# Patient Record
Sex: MALE | Race: WHITE | NOT HISPANIC OR LATINO | Employment: OTHER | URBAN - METROPOLITAN AREA
[De-identification: names, ages, dates, MRNs, and addresses within clinical notes are randomized per-mention and may not be internally consistent; named-entity substitution may affect disease eponyms.]

---

## 2017-08-30 ENCOUNTER — OFFICE VISIT (OUTPATIENT)
Dept: ORTHOPEDICS | Facility: CLINIC | Age: 67
End: 2017-08-30
Payer: MEDICARE

## 2017-08-30 VITALS
DIASTOLIC BLOOD PRESSURE: 77 MMHG | HEART RATE: 75 BPM | HEIGHT: 71 IN | BODY MASS INDEX: 33.46 KG/M2 | WEIGHT: 239 LBS | SYSTOLIC BLOOD PRESSURE: 157 MMHG

## 2017-08-30 DIAGNOSIS — Z96.653 S/P TOTAL KNEE REPLACEMENT, BILATERAL: Primary | ICD-10-CM

## 2017-08-30 LAB — CRP SERPL-MCNC: 0.33 MG/DL (ref 0–1.4)

## 2017-08-30 PROCEDURE — 73560 X-RAY EXAM OF KNEE 1 OR 2: CPT | Mod: RT,,, | Performed by: ORTHOPAEDIC SURGERY

## 2017-08-30 PROCEDURE — 99213 OFFICE O/P EST LOW 20 MIN: CPT | Mod: ,,, | Performed by: ORTHOPAEDIC SURGERY

## 2017-08-30 PROCEDURE — 1125F AMNT PAIN NOTED PAIN PRSNT: CPT | Mod: ,,, | Performed by: ORTHOPAEDIC SURGERY

## 2017-08-30 PROCEDURE — 3008F BODY MASS INDEX DOCD: CPT | Mod: ,,, | Performed by: ORTHOPAEDIC SURGERY

## 2017-08-30 PROCEDURE — 1159F MED LIST DOCD IN RCRD: CPT | Mod: ,,, | Performed by: ORTHOPAEDIC SURGERY

## 2017-08-30 RX ORDER — CLOTRIMAZOLE 1 G/ML
1 SOLUTION TOPICAL 2 TIMES DAILY
COMMUNITY

## 2017-08-30 RX ORDER — UREA 10 %
1 LOTION (ML) TOPICAL
COMMUNITY

## 2017-08-30 RX ORDER — BUSPIRONE HYDROCHLORIDE 10 MG/1
20 TABLET ORAL 3 TIMES DAILY
COMMUNITY
End: 2019-02-27

## 2017-08-30 RX ORDER — NAPROXEN 500 MG/1
500 TABLET ORAL 2 TIMES DAILY WITH MEALS
COMMUNITY

## 2017-08-30 RX ORDER — ATORVASTATIN CALCIUM 80 MG/1
40 TABLET, FILM COATED ORAL NIGHTLY
COMMUNITY

## 2017-08-30 RX ORDER — PANTOPRAZOLE SODIUM 40 MG/1
40 TABLET, DELAYED RELEASE ORAL DAILY
COMMUNITY

## 2017-08-30 RX ORDER — KETOCONAZOLE 20 MG/ML
1 SHAMPOO, SUSPENSION TOPICAL
COMMUNITY

## 2017-08-30 RX ORDER — METHOCARBAMOL 750 MG/1
500 TABLET, FILM COATED ORAL 3 TIMES DAILY
COMMUNITY

## 2017-08-30 RX ORDER — AMMONIUM LACTATE 12 G/100G
1 CREAM TOPICAL
Status: ON HOLD | COMMUNITY
End: 2018-02-06

## 2017-08-30 RX ORDER — SERTRALINE HYDROCHLORIDE 100 MG/1
100 TABLET, FILM COATED ORAL DAILY
Status: ON HOLD | COMMUNITY
End: 2018-02-06 | Stop reason: ALTCHOICE

## 2017-08-30 RX ORDER — OXYCODONE AND ACETAMINOPHEN 10; 325 MG/1; MG/1
1 TABLET ORAL
COMMUNITY
End: 2018-04-27 | Stop reason: SDUPTHER

## 2017-08-30 RX ORDER — GABAPENTIN 400 MG/1
400 CAPSULE ORAL 3 TIMES DAILY
COMMUNITY

## 2017-08-30 RX ORDER — OXYBUTYNIN CHLORIDE 5 MG/1
5 TABLET ORAL 3 TIMES DAILY
COMMUNITY

## 2017-08-30 NOTE — PROGRESS NOTES
Past Medical History:   Diagnosis Date    Arthritis     Cancer     Hypertension        Past Surgical History:   Procedure Laterality Date    BACK SURGERY      burnt nerves      ELBOW SURGERY  2010, 2008    left, right    GALLBLADDER SURGERY      KNEE SURGERY  2001, 2007    left and right knee    prostate cancer      SHOULDER SURGERY  2004    right shoulder       Current Outpatient Prescriptions   Medication Sig    ammonium lactate 12 % Crea Apply topically.    atorvastatin (LIPITOR) 80 MG tablet Take 80 mg by mouth once daily.    BED UNDERPADS MISC by Misc.(Non-Drug; Combo Route) route.    busPIRone (BUSPAR) 10 MG tablet Take 10 mg by mouth 3 (three) times daily.    clotrimazole (LOTRIMIN) 1 % Soln Apply topically 2 (two) times daily.    DIAPER,BRIEF,ADULT, DISPOSABLE (DEPEND UNDERWEAR FOR MEN L-XL MISC) by Misc.(Non-Drug; Combo Route) route.    gabapentin (NEURONTIN) 400 MG capsule Take 400 mg by mouth 3 (three) times daily.    INCONTINENCE PAD,LINER,DISP (POISE PADS EXTRA PLUS MISC) by Misc.(Non-Drug; Combo Route) route.    ketoconazole (NIZORAL) 2 % shampoo Apply topically twice a week.    methocarbamol (ROBAXIN) 750 MG Tab Take 500 mg by mouth 3 (three) times daily.    naproxen (NAPROSYN) 500 MG tablet Take 500 mg by mouth 2 (two) times daily with meals.    oxybutynin (DITROPAN) 5 MG Tab Take 5 mg by mouth 3 (three) times daily.    oxycodone-acetaminophen (PERCOCET)  mg per tablet Take 1 tablet by mouth as needed for Pain.    pantoprazole (PROTONIX) 40 MG tablet Take 40 mg by mouth once daily.    sertraline (ZOLOFT) 100 MG tablet Take 100 mg by mouth 2 (two) times daily.    urea (CARMOL) 10 % cream Apply topically as needed.    VITAMIN B COMPLEX ORAL Take by mouth Daily.     No current facility-administered medications for this visit.        Review of patient's allergies indicates:  No Known Allergies    History reviewed. No pertinent family history.    Social History     Social  "History    Marital status:      Spouse name: N/A    Number of children: N/A    Years of education: N/A     Occupational History    Not on file.     Social History Main Topics    Smoking status: Former Smoker    Smokeless tobacco: Never Used    Alcohol use No    Drug use: No    Sexual activity: Not on file     Other Topics Concern    Not on file     Social History Narrative    No narrative on file       Chief Complaint:   Chief Complaint   Patient presents with    Right Knee - Follow-up     His knee has been painful for a couple years now and was drained at his last visit about a year ago.  He is also experiencing ankle swelling.  He had right knee surgery in 2007.       Consulting Physician: No ref. provider found    History of Present Illness:    This is a 67 y.o. year old male who complains of patient returns today follow-up of his right knee had a total knee replacement done a few years several years ago but Dr. Ferrari he complains of some swelling at times he states he has some discomfort at night      ROS    Examination:    Vital Signs:    Vitals:    08/30/17 0843   BP: (!) 157/77   Pulse: 75   Weight: 108.4 kg (239 lb)   Height: 5' 10.5" (1.791 m)   PainSc:   8       This a well-developed, well nourished patient in no acute distress.    Alert and oriented and cooperative to examination.       Physical Exam:  Right Knee Exam    Gait:   Normal    Skin  Rash:   None  Scars:   healed    Inspection  Erythema:  None  Bruising:  None  Effusion:    Masses:  None  Lymphadenopathy: None    Range of Motion: 0 to  90    Medial Joint : None  Lateral Joint : None    Patellofemoral Tenderness: None  Patellofemoral Crepitus: None    Lachman:  Normal  Anterior Drawer: Normal  Posterior Drawer: Normal    Terrell's:  Negative  Apley's:  Negative    Varus Stress:  Stable  Valgus Stress:  Stable    Strength:  5/5    Pulses:  Palpable distal    Sensation:  Intact          Imaging: X-rays " ordered and reviewed today x-ray today of the right knee shows a right total knee replacement in good position no evidence of any loosening     Assessment: status post right total knee replacement with effusion    Plan:  We'll get a CRP and see if there is any evidence of possible infection      DISCLAIMER: This note may have been dictated using voice recognition software and may contain grammatical errors.     NOTE: Consult report sent to referring provider via [employer] EMR.

## 2017-09-06 ENCOUNTER — OFFICE VISIT (OUTPATIENT)
Dept: ORTHOPEDICS | Facility: CLINIC | Age: 67
End: 2017-09-06
Payer: MEDICARE

## 2017-09-06 VITALS
HEIGHT: 71 IN | DIASTOLIC BLOOD PRESSURE: 62 MMHG | HEART RATE: 61 BPM | SYSTOLIC BLOOD PRESSURE: 157 MMHG | WEIGHT: 239 LBS | BODY MASS INDEX: 33.46 KG/M2

## 2017-09-06 DIAGNOSIS — Z96.653 S/P TOTAL KNEE REPLACEMENT, BILATERAL: Primary | ICD-10-CM

## 2017-09-06 PROCEDURE — 1125F AMNT PAIN NOTED PAIN PRSNT: CPT | Mod: ,,, | Performed by: ORTHOPAEDIC SURGERY

## 2017-09-06 PROCEDURE — 1159F MED LIST DOCD IN RCRD: CPT | Mod: ,,, | Performed by: ORTHOPAEDIC SURGERY

## 2017-09-06 PROCEDURE — 99213 OFFICE O/P EST LOW 20 MIN: CPT | Mod: 25,,, | Performed by: ORTHOPAEDIC SURGERY

## 2017-09-06 PROCEDURE — 20610 DRAIN/INJ JOINT/BURSA W/O US: CPT | Mod: RT,,, | Performed by: ORTHOPAEDIC SURGERY

## 2017-09-06 PROCEDURE — 3008F BODY MASS INDEX DOCD: CPT | Mod: ,,, | Performed by: ORTHOPAEDIC SURGERY

## 2017-09-06 NOTE — PROCEDURES
Large Joint Aspiration/Injection  Date/Time: 9/6/2017 2:58 PM  Performed by: STEVE RILEY  Authorized by: STEVE RILEY     Consent Done?:  Yes (Verbal)  Indications:  Pain and joint swelling  Procedure site marked: Yes    Timeout: Prior to procedure the correct patient, procedure, and site was verified      Location:  Knee  Site:  R knee  Prep: Patient was prepped and draped in usual sterile fashion    Needle size:  22 G  Approach:  Lateral  Patient tolerance:  Patient tolerated the procedure well with no immediate complications

## 2017-09-06 NOTE — PROGRESS NOTES
Past Medical History:   Diagnosis Date    Arthritis     Cancer     Hypertension        Past Surgical History:   Procedure Laterality Date    BACK SURGERY      burnt nerves      ELBOW SURGERY  2010, 2008    left, right    GALLBLADDER SURGERY      KNEE SURGERY  2001, 2007    left and right knee    prostate cancer      SHOULDER SURGERY  2004    right shoulder       Current Outpatient Prescriptions   Medication Sig    ammonium lactate 12 % Crea Apply topically.    atorvastatin (LIPITOR) 80 MG tablet Take 80 mg by mouth once daily.    BED UNDERPADS MISC by Misc.(Non-Drug; Combo Route) route.    busPIRone (BUSPAR) 10 MG tablet Take 10 mg by mouth 3 (three) times daily.    clotrimazole (LOTRIMIN) 1 % Soln Apply topically 2 (two) times daily.    DIAPER,BRIEF,ADULT, DISPOSABLE (DEPEND UNDERWEAR FOR MEN L-XL MISC) by Misc.(Non-Drug; Combo Route) route.    gabapentin (NEURONTIN) 400 MG capsule Take 400 mg by mouth 3 (three) times daily.    INCONTINENCE PAD,LINER,DISP (POISE PADS EXTRA PLUS MISC) by Misc.(Non-Drug; Combo Route) route.    ketoconazole (NIZORAL) 2 % shampoo Apply topically twice a week.    methocarbamol (ROBAXIN) 750 MG Tab Take 500 mg by mouth 3 (three) times daily.    naproxen (NAPROSYN) 500 MG tablet Take 500 mg by mouth 2 (two) times daily with meals.    oxybutynin (DITROPAN) 5 MG Tab Take 5 mg by mouth 3 (three) times daily.    oxycodone-acetaminophen (PERCOCET)  mg per tablet Take 1 tablet by mouth as needed for Pain.    pantoprazole (PROTONIX) 40 MG tablet Take 40 mg by mouth once daily.    sertraline (ZOLOFT) 100 MG tablet Take 100 mg by mouth 2 (two) times daily.    urea (CARMOL) 10 % cream Apply topically as needed.    VITAMIN B COMPLEX ORAL Take by mouth Daily.     No current facility-administered medications for this visit.        Review of patient's allergies indicates:  No Known Allergies    History reviewed. No pertinent family history.    Social History     Social  "History    Marital status:      Spouse name: N/A    Number of children: N/A    Years of education: N/A     Occupational History    Not on file.     Social History Main Topics    Smoking status: Former Smoker    Smokeless tobacco: Never Used    Alcohol use No    Drug use: No    Sexual activity: Not on file     Other Topics Concern    Not on file     Social History Narrative    No narrative on file       Chief Complaint:   Chief Complaint   Patient presents with    Right Knee - Follow-up     His knee has been painful for a couple years now and has had previous knee surgery.  He is still having pain in his right knee but is here today for his lab results.  They are in the chart under labs.       Consulting Physician: No ref. provider found    History of Present Illness:    This is a 67 y.o. year old male who complains of his returns today for results of his blood work he still has some effusion in the right knee      ROS    Examination:    Vital Signs:    Vitals:    09/06/17 1421   BP: (!) 157/62   Pulse: 61   Weight: 108.4 kg (239 lb)   Height: 5' 10.5" (1.791 m)   PainSc:   8       This a well-developed, well nourished patient in no acute distress.    Alert and oriented and cooperative to examination.       Physical Exam: rightKnee Exam    Gait   Normal    Skin  Rash:   None  Scars:   None    Inspection  Erythema:  None  Bruising:  None  Effusion:  moderate  Masses:  None  Lymphadenopathy: None    Range of Motion: 0 to 110    Medial Joint : None  Lateral Joint : None    Patellofemoral Tenderness: None  Patellofemoral Crepitus: None    Lachman:  Normal  Anterior Drawer: Normal  Posterior Drawer: Normal    Terrell's:  Negative  Apley's:  Negative    Varus Stress:  Stable  Valgus Stress:  Stable    Strength:  5/5    Pulses:  Palpable distal    Sensation:  Intact          Imaging: X-rays ordered and reviewed today previous x-rays show a well-positionedright total knee replacement " with no loosening     Assessment: status post right total knee replacement with recurrent effusion and tightness    Plan:  We'll aspirate the knee and send it in for cell count cultures      DISCLAIMER: This note may have been dictated using voice recognition software and may contain grammatical errors.     NOTE: Consult report sent to referring provider via [employer] EMR.

## 2017-09-13 ENCOUNTER — OFFICE VISIT (OUTPATIENT)
Dept: ORTHOPEDICS | Facility: CLINIC | Age: 67
End: 2017-09-13
Payer: MEDICARE

## 2017-09-13 VITALS
WEIGHT: 239 LBS | HEIGHT: 71 IN | DIASTOLIC BLOOD PRESSURE: 78 MMHG | SYSTOLIC BLOOD PRESSURE: 146 MMHG | BODY MASS INDEX: 33.46 KG/M2 | HEART RATE: 88 BPM

## 2017-09-13 DIAGNOSIS — Z96.653 S/P TOTAL KNEE REPLACEMENT, BILATERAL: Primary | ICD-10-CM

## 2017-09-13 PROCEDURE — 99213 OFFICE O/P EST LOW 20 MIN: CPT | Mod: 25,,, | Performed by: ORTHOPAEDIC SURGERY

## 2017-09-13 PROCEDURE — 3008F BODY MASS INDEX DOCD: CPT | Mod: ,,, | Performed by: ORTHOPAEDIC SURGERY

## 2017-09-13 PROCEDURE — 73560 X-RAY EXAM OF KNEE 1 OR 2: CPT | Mod: RT,,, | Performed by: ORTHOPAEDIC SURGERY

## 2017-09-13 PROCEDURE — 1125F AMNT PAIN NOTED PAIN PRSNT: CPT | Mod: ,,, | Performed by: ORTHOPAEDIC SURGERY

## 2017-09-13 PROCEDURE — 1159F MED LIST DOCD IN RCRD: CPT | Mod: ,,, | Performed by: ORTHOPAEDIC SURGERY

## 2017-09-13 NOTE — PROGRESS NOTES
Past Medical History:   Diagnosis Date    Arthritis     Cancer     Hypertension        Past Surgical History:   Procedure Laterality Date    BACK SURGERY      burnt nerves      ELBOW SURGERY  2010, 2008    left, right    GALLBLADDER SURGERY      KNEE SURGERY  2001, 2007    left and right knee    prostate cancer      SHOULDER SURGERY  2004    right shoulder       Current Outpatient Prescriptions   Medication Sig    ammonium lactate 12 % Crea Apply topically.    atorvastatin (LIPITOR) 80 MG tablet Take 80 mg by mouth once daily.    BED UNDERPADS MISC by Misc.(Non-Drug; Combo Route) route.    busPIRone (BUSPAR) 10 MG tablet Take 10 mg by mouth 3 (three) times daily.    clotrimazole (LOTRIMIN) 1 % Soln Apply topically 2 (two) times daily.    DIAPER,BRIEF,ADULT, DISPOSABLE (DEPEND UNDERWEAR FOR MEN L-XL MISC) by Misc.(Non-Drug; Combo Route) route.    gabapentin (NEURONTIN) 400 MG capsule Take 400 mg by mouth 3 (three) times daily.    INCONTINENCE PAD,LINER,DISP (POISE PADS EXTRA PLUS MISC) by Misc.(Non-Drug; Combo Route) route.    ketoconazole (NIZORAL) 2 % shampoo Apply topically twice a week.    methocarbamol (ROBAXIN) 750 MG Tab Take 500 mg by mouth 3 (three) times daily.    naproxen (NAPROSYN) 500 MG tablet Take 500 mg by mouth 2 (two) times daily with meals.    oxybutynin (DITROPAN) 5 MG Tab Take 5 mg by mouth 3 (three) times daily.    oxycodone-acetaminophen (PERCOCET)  mg per tablet Take 1 tablet by mouth as needed for Pain.    pantoprazole (PROTONIX) 40 MG tablet Take 40 mg by mouth once daily.    sertraline (ZOLOFT) 100 MG tablet Take 100 mg by mouth 2 (two) times daily.    urea (CARMOL) 10 % cream Apply topically as needed.    VITAMIN B COMPLEX ORAL Take by mouth Daily.     No current facility-administered medications for this visit.        Review of patient's allergies indicates:  No Known Allergies    History reviewed. No pertinent family history.    Social History     Social  "History    Marital status:      Spouse name: N/A    Number of children: N/A    Years of education: N/A     Occupational History    Not on file.     Social History Main Topics    Smoking status: Former Smoker    Smokeless tobacco: Never Used    Alcohol use No    Drug use: No    Sexual activity: Not on file     Other Topics Concern    Not on file     Social History Narrative    No narrative on file       Chief Complaint:   Chief Complaint   Patient presents with    Right Knee - Pain     Right knee arthroplasty in 2007. 09/11/2017 He was kneeling on right knee  leaning on back of left heel connecting garden ligths and his knee froze. Patient walking with cane.        Consulting Physician: No ref. provider found    History of Present Illness:    This is a 67 y.o. year old male who complains of patient returns today with the lab results of an aspiration to his right knee he has a total knee replacement      ROS    Examination:    Vital Signs:    Vitals:    09/13/17 1103   BP: (!) 146/78   Pulse: 88   Weight: 108.4 kg (239 lb)   Height: 5' 10.5" (1.791 m)   PainSc:   6   PainLoc: Knee       This a well-developed, well nourished patient in no acute distress.    Alert and oriented and cooperative to examination.       Physical Exam: Right Knee Exam    Gait:   Normal    Skin  Rash:   None  Scars:   None    Inspection  Erythema:  None  Bruising:  None  Effusion:  None  Masses:  None  Lymphadenopathy: None    Range of Motion: 0 to1 00    Medial Joint : None  Lateral Joint : None    Patellofemoral Tenderness: None  Patellofemoral Crepitus: None    Lachman:  Normal  Anterior Drawer: Normal  Posterior Drawer: Normal    Terrell's:  Negative  Apley's:  Negative    Varus Stress:  Stable  Valgus Stress:  Stable    Strength:  5/5    Pulses:  Palpable distal    Sensation:  Intact    Labs-patient has a C-reactive protein which was 0.33 within normal limits his cell count is normal on his " aspirate did not show any evidence of any infection also his cultures of the aspirate were negative for any growth noted they see any arterial and a Gram stain      Imaging: X-rays ordered and reviewed today no x-rays done today     Assessment: tatus post right total knee replacement negative workup on possible infection to the right knee    Plan:  Patient has been scheduled for physical therapy to right knee will see him back in about 6 weeks      DISCLAIMER: This note may have been dictated using voice recognition software and may contain grammatical errors.     NOTE: Consult report sent to referring provider via [employer] EMR.

## 2017-10-25 ENCOUNTER — OFFICE VISIT (OUTPATIENT)
Dept: ORTHOPEDICS | Facility: CLINIC | Age: 67
End: 2017-10-25
Payer: MEDICARE

## 2017-10-25 VITALS
HEART RATE: 76 BPM | WEIGHT: 239 LBS | DIASTOLIC BLOOD PRESSURE: 89 MMHG | HEIGHT: 71 IN | SYSTOLIC BLOOD PRESSURE: 132 MMHG | BODY MASS INDEX: 33.46 KG/M2

## 2017-10-25 DIAGNOSIS — Z96.653 S/P TOTAL KNEE REPLACEMENT, BILATERAL: Primary | ICD-10-CM

## 2017-10-25 PROCEDURE — 99213 OFFICE O/P EST LOW 20 MIN: CPT | Mod: ,,, | Performed by: ORTHOPAEDIC SURGERY

## 2017-10-25 RX ORDER — PNEUMOCOCCAL 13-VALENT CONJUGATE VACCINE 2.2; 2.2; 2.2; 2.2; 2.2; 4.4; 2.2; 2.2; 2.2; 2.2; 2.2; 2.2; 2.2 UG/.5ML; UG/.5ML; UG/.5ML; UG/.5ML; UG/.5ML; UG/.5ML; UG/.5ML; UG/.5ML; UG/.5ML; UG/.5ML; UG/.5ML; UG/.5ML; UG/.5ML
INJECTION, SUSPENSION INTRAMUSCULAR
Refills: 0 | Status: ON HOLD | COMMUNITY
Start: 2017-10-02 | End: 2018-02-06

## 2017-10-25 NOTE — PROGRESS NOTES
Past Medical History:   Diagnosis Date    Arthritis     Cancer     Hypertension        Past Surgical History:   Procedure Laterality Date    BACK SURGERY      burnt nerves      ELBOW SURGERY  2010, 2008    left, right    GALLBLADDER SURGERY      KNEE SURGERY  2001, 2007    left and right knee    prostate cancer      SHOULDER SURGERY  2004    right shoulder       Current Outpatient Prescriptions   Medication Sig    ammonium lactate 12 % Crea Apply topically.    atorvastatin (LIPITOR) 80 MG tablet Take 80 mg by mouth once daily.    BED UNDERPADS MISC by Misc.(Non-Drug; Combo Route) route.    busPIRone (BUSPAR) 10 MG tablet Take 10 mg by mouth 3 (three) times daily.    clotrimazole (LOTRIMIN) 1 % Soln Apply topically 2 (two) times daily.    DIAPER,BRIEF,ADULT, DISPOSABLE (DEPEND UNDERWEAR FOR MEN L-XL MISC) by Misc.(Non-Drug; Combo Route) route.    FLUZONE HIGH-DOSE 2017-18, PF, 180 mcg/0.5 mL vaccine ADM 0.5ML IM UTD    gabapentin (NEURONTIN) 400 MG capsule Take 400 mg by mouth 3 (three) times daily.    INCONTINENCE PAD,LINER,DISP (POISE PADS EXTRA PLUS MISC) by Misc.(Non-Drug; Combo Route) route.    ketoconazole (NIZORAL) 2 % shampoo Apply topically twice a week.    methocarbamol (ROBAXIN) 750 MG Tab Take 500 mg by mouth 3 (three) times daily.    naproxen (NAPROSYN) 500 MG tablet Take 500 mg by mouth 2 (two) times daily with meals.    oxybutynin (DITROPAN) 5 MG Tab Take 5 mg by mouth 3 (three) times daily.    oxycodone-acetaminophen (PERCOCET)  mg per tablet Take 1 tablet by mouth as needed for Pain.    pantoprazole (PROTONIX) 40 MG tablet Take 40 mg by mouth once daily.    PREVNAR 13, PF, 0.5 mL Syrg ADM 0.5ML IM UTD    sertraline (ZOLOFT) 100 MG tablet Take 100 mg by mouth 2 (two) times daily.    urea (CARMOL) 10 % cream Apply topically as needed.    VITAMIN B COMPLEX ORAL Take by mouth Daily.     No current facility-administered medications for this visit.        Review of  "patient's allergies indicates:  No Known Allergies    History reviewed. No pertinent family history.    Social History     Social History    Marital status:      Spouse name: N/A    Number of children: N/A    Years of education: N/A     Occupational History    Not on file.     Social History Main Topics    Smoking status: Former Smoker    Smokeless tobacco: Never Used    Alcohol use No    Drug use: No    Sexual activity: Not on file     Other Topics Concern    Not on file     Social History Narrative    No narrative on file       Chief Complaint:   Chief Complaint   Patient presents with    Right Knee - Pain, Follow-up     DOI: 09/11/2017, Patient still c/o right knee tightness. He is in therapy and he states they are using stimulation for scar tissue of the knee. Patient had Right TKA about 11 years ago by Frida Ledezma.        Consulting Physician: No ref. provider found    History of Present Illness:    This is a 67 y.o. year old male who complains of patient returns today for follow-up of his right knee he has had a previous knee replacement about 11 years ago and he has been complaining mostly of some decreased range of motion and some stiffness his pain at this time is mild to moderate      ROS    Examination:    Vital Signs:    Vitals:    10/25/17 1029   BP: 132/89   Pulse: 76   Weight: 108.4 kg (239 lb)   Height: 5' 10.5" (1.791 m)   PainSc:   7   PainLoc: Knee       This a well-developed, well nourished patient in no acute distress.    Alert and oriented and cooperative to examination.       Physical Exam: right knee-patient's incision is healed well is no cellulitis or any warmth he has flexion to about 100°    Imaging: X-raysprevious x-rays of his right knee shows some weightbearing and decrease in height of the polyethylene spacer over the tibia I am not sure at this time given that he had the patella resurfaced    Assessment: status post right total knee replacement with a very thin " plastic polyethylene spacer secondary to wear    Plan:  We discussed the possibility of revision of the total knee replacement right side with replacement of the Clara and possibly the resurfacing of the patella patient is considering doing this after the first year I will get with the [employer] representative to see if a Clara is available for revision      DISCLAIMER: This note may have been dictated using voice recognition software and may contain grammatical errors.     NOTE: Consult report sent to referring provider via [employer] EMR.

## 2018-01-25 ENCOUNTER — HOSPITAL ENCOUNTER (OUTPATIENT)
Dept: RADIOLOGY | Facility: HOSPITAL | Age: 68
Discharge: HOME | End: 2018-01-25
Attending: ORTHOPAEDIC SURGERY
Payer: MEDICARE

## 2018-01-25 ENCOUNTER — HOSPITAL ENCOUNTER (OUTPATIENT)
Dept: PREADMISSION TESTING | Facility: HOSPITAL | Age: 68
Discharge: HOME | End: 2018-01-25
Attending: ORTHOPAEDIC SURGERY
Payer: MEDICARE

## 2018-01-25 VITALS — BODY MASS INDEX: 34.02 KG/M2 | WEIGHT: 243 LBS | HEIGHT: 71 IN

## 2018-01-25 DIAGNOSIS — T84.84XA PAIN IN PROSTHETIC JOINT, INITIAL ENCOUNTER: Primary | ICD-10-CM

## 2018-01-25 DIAGNOSIS — Z01.818 PRE-OP EXAM: ICD-10-CM

## 2018-01-25 DIAGNOSIS — Z96.651 PRESENCE OF RIGHT ARTIFICIAL KNEE JOINT: ICD-10-CM

## 2018-01-25 PROCEDURE — 99900104 DSU ONLY-NO CHARGE-EA ADD'L HR (STAT)

## 2018-01-25 PROCEDURE — 71046 X-RAY EXAM CHEST 2 VIEWS: CPT | Mod: 26,,, | Performed by: RADIOLOGY

## 2018-01-25 PROCEDURE — 71046 X-RAY EXAM CHEST 2 VIEWS: CPT | Mod: TC,FY

## 2018-01-25 PROCEDURE — 93005 ELECTROCARDIOGRAM TRACING: CPT

## 2018-01-25 PROCEDURE — 93010 ELECTROCARDIOGRAM REPORT: CPT | Mod: ,,, | Performed by: INTERNAL MEDICINE

## 2018-01-25 PROCEDURE — 87081 CULTURE SCREEN ONLY: CPT

## 2018-01-25 PROCEDURE — 99900103 DSU ONLY-NO CHARGE-INITIAL HR (STAT)

## 2018-01-25 RX ORDER — DULOXETIN HYDROCHLORIDE 20 MG/1
20 CAPSULE, DELAYED RELEASE ORAL 2 TIMES DAILY
COMMUNITY

## 2018-01-25 NOTE — PRE ADMISSION SCREENING
Patient Name: Jorgito Mora III  YOB: 1950   MRN: [id number]     [facility]   Basic Mobility Inpatient Short Form 6 Clicks         How much difficulty does the patient currently have  Unable  A Lot  A Little  None      1. Turning over in bed (including adjusting bedclothes, sheets and blankets)?     1 []    2 []    3 []    4 [x]        2. Sitting down on and standing up from a chair with arms (e.g., wheelchair, bedside commode, etc.)     1 []  2 [x]  3 []     4 []      3. Moving from lying on back to sitting on the side of the bed?     1 []  2 []  3 [x]    4 []    How much help from another person does the patient currently need  Total  A Lot  A Little  None      4. Moving to and from a bed to a chair (including a wheelchair)?    1 []  2 []  3 []    4 [x]      5. Need to walk in hospital room?    1 []  2 []  3 []    4 [x]      6. Climbing 3-5 steps with a railing?    1 []  2 []  3 []    4 [x]       Raw Score:      21            CMS 0-100% Score:  28.97          %   Standardized Score:   50.25            CMS Modifier:      CJ                                    [facility]   Basic Mobility Inpatient Short Form 6 Clicks Score Conversion Table*         *Use this form to convert -PAC Basic Mobility Inpatient Raw Scores.   [facility] Inpatient Basic Mobility Short Form Scoring Example   1. Add the number values associated with the response to each item. For example, items totals yield a Raw Score of 21.   2. Match the raw score to the t-Scale scores (t-Scale score = 50.25, SE = 4.69).   3. Find the associated CMS % (CMS % = 28.97%).   4. Locate the correct CMS Functional Modifier Code, or G Code (G code = CJ)     NOTE: Each -PAC Short Form has a separate conversion table. Make sure that you use the correct conversion table.       Instruction Manual - page 45 contains conversion table

## 2018-01-25 NOTE — PRE ADMISSION SCREENING
JOINT CAMP ASSESSMENT    Name Jorgito Mora III   MRN [id number]    Age/Sex 67 y.o. male    Surgeon Dr. Trell Chase Jr.   Joint Camp Date 1/25/2018   Surgery Date 02/06/2018   Procedure Right Knee Arthroplasty - Revision   Insurance Payor: [employer] / Plan: [employer] 65 / Product Type: Medicare Advantage /    Care Team Patient Care Team:  Primary Doctor No as PCP - General  Daryl James (Physical Therapy)    Pharmacy   [facility] [id number]  [place], [place] - [address]  [address] [place] [address]  Phone: [phone] Fax: [phone]     AM-PAC Score   21   Risk Assessment Score 6     Past Medical History:   Diagnosis Date    Arthritis     Cancer 2005    Prostate    Hypertension        Past Surgical History:   Procedure Laterality Date    BACK SURGERY      burnt nerves      ELBOW SURGERY  2010, 2008    left, right    GALLBLADDER SURGERY      JOINT REPLACEMENT  2007, 2012    Bilateral knees    KNEE SURGERY  2001, 2007    left and right knee    Neck surgery with plate      Crushed C2-3-4    prostate cancer      SHOULDER SURGERY  2004    right shoulder         Home Enviroment     Living Arrangement: Lives with spouse  Home Environment: 1-story house/ trailer, number of outside stairs: 1, tub-shower  Home Safety Concerns: Pets in the home: dogs (1).    DISCHARGE CAREGIVER/SUPPORT SYSTEM     Identified post-op caregiver: Patient has spouse / significant other.  Patient's caregiver(s) will be able to provide physical assistance. Patient will have someone to assist overnight.      Caregiver present at pre-op interview:  Yes      PRE-OPERATIVE FUNCTIONAL STATUS     Employment: Retired    Pre-op Functional Status: Patient is independent with mobility/ambulation, transfers, ADL's, IADL's.    Use of assistive device for ambulation: straight cane, walker and scooter  ADL: self care  ADL Limitations: difficulty with walking  Medical  Restrictions: Decreased range of motions in extremities    POTENTIAL BARRIERS TO DISCHARGE/POTENTIAL POST-OP COMPLICATIONS   Patient currently on opioid treatment for neuropathy and back pain. Consider pain control options.      DISCHARGE PLAN     Expected LOS of 2 days or less for joint replacement discussed with patient. We also discussed a discharge path of HH for approximately two weeks with a transition to outpatient PT on the third week given no post-op complications.      Patient in agreement with discharge plan: Yes    Discharge to: Discharge home with home mandeep (PT/OT) x2 weeks with transition to outpatient PT     HH: [facility]      OP PT: [facility] DME: rolling walker, single point cane, bedside commode and transfer tub bench     Needed DME at D/C: none     Rx: Per Dr. Chase at discharge.     Meds to Beds: N/A  Patient expected to discharge on Aspirin 325 mg by mouth twice daily for DVT prophylaxis.

## 2018-01-25 NOTE — PRE ADMISSION SCREENING
"               [facility] Risk Assessment Scale    Patient Name: Jorgito Mora III  YOB: 1950  MRN: [id number]            RIsk Factor Measure Recommendation Patient Data Scale/Score   BMI >40 Reconsider surgery, weight loss   Estimated body mass index is 34.37 kg/m² as calculated from the following:    Height as of this encounter: 5' 10.5" (1.791 m).    Weight as of this encounter: 110.2 kg (243 lb).   [] 0 = 1 - 24.9  [] 1 = 25-29.9  [x] 2 = 30-34.9  [] 3 = 35-39.9  [] 4 = 40-44.9  [] 5 = 45-99.9   Hemoglobin AIC (if applicable) >9 Delay surgery until DM under control  Refer for:  · Nutrition Therapy  · Exercise   · Medication    No results found for: LABA1C, HGBA1C    Lab Results   Component Value Date     05/28/2010      [] 0 = 4.0-5.6  [] 1 = 5.7-6.4  [] 2 = 6.5-6.9  [] 3 = 7.0-7.9  [] 4 = 8.0-8.9  [] 5 = 9.0-12   Hemoglobin (Anemia) <9 Delay surgery   Correct anemia Lab Results   Component Value Date    HGB 14.7 05/13/2009    [] 20 - <9.0                    Albumin <3 Delay surgery &Workup Lab Results   Component Value Date    ALBUMIN 3.6 05/28/2010    [] 20 - <3.0   Smoking Cessation >4 Weeks Delay Surgery  Refer to OP Cessation Class    Former Smoker [] 20 - current smoker                                _____ PPD                    Hx of MI, PE, Arrhythmia, CVA, DVT <30 Days Delay Surgery    N/A [] 20      Infection Variable Delay surgery and re-evaluate   N/A [] 20 - recent/current infection     Depression (PHQ) >10 out of 27 Delay Surgery and re-evaluate  Medication  Counseling              [x] 0     []1     []2     []3      []4      [] 5                    (1-4)      (5-9)  (10-14)  (15-19)   (20-27)     Memory Impairment & Memory loss (Mini-Cog Screening Tool) Advanced dementia and/or Parkinson's Reconsider surgery     [x] 0     []1     []2     []3     []4     [] 5     Physical Conditioning (Modified AM-PAC Per Physical Therapy at [facility]) Unable to ambulate on day of surgery Delay " surgery and re-evaluate  Pre-Rehabilitation   (PT evaluation)       []  0   [x]4       []8     []12        []16     []20       (<20%)   (<40%)   (<60%)   (<80% )    (>80%)     Home Environment/Caregiver support  (Per /Navigator Interview)    Availability of basic services and/or approprate assistance during post-operative period Delay surgery and re-evaluate  Safe home environment  Health   1 week post-surgery  Transportation  availability  Ability to obtain DME/Medications post-op    [x] 0     []1     []2     []3     []4     [] 5  [x] 0     []1     []2     []3     []4     [] 5  [x] 0     []1     []2     []3     []4     [] 5  [x] 0     []1     []2     []3     []4     [] 5         MD Contact: Dr. Chase Comments:  Total Score:  6

## 2018-01-27 LAB — MRSA SPEC QL CULT: NORMAL

## 2018-01-31 NOTE — OR NURSING
6d ago   MRSA Surveillance Screen MRSA isolated      Called office regarding positive MRSA. Spoke to Maricruz. Awaiting orders.

## 2018-02-05 ENCOUNTER — ANESTHESIA EVENT (OUTPATIENT)
Dept: SURGERY | Facility: HOSPITAL | Age: 68
DRG: 489 | End: 2018-02-05
Payer: MEDICARE

## 2018-02-05 NOTE — OR NURSING
Per CYNDI Darby in Dr. Chase's office, change order to 1g IVPB Vancomycin, and order mupiprocin to be applied to both nares prior to surgery.

## 2018-02-06 ENCOUNTER — HOSPITAL ENCOUNTER (INPATIENT)
Facility: HOSPITAL | Age: 68
LOS: 1 days | Discharge: HOME | DRG: 489 | End: 2018-02-07
Attending: ORTHOPAEDIC SURGERY | Admitting: INTERNAL MEDICINE
Payer: MEDICARE

## 2018-02-06 ENCOUNTER — ANESTHESIA (OUTPATIENT)
Dept: SURGERY | Facility: HOSPITAL | Age: 68
DRG: 489 | End: 2018-02-06
Payer: MEDICARE

## 2018-02-06 DIAGNOSIS — F32.A DEPRESSION, UNSPECIFIED DEPRESSION TYPE: ICD-10-CM

## 2018-02-06 DIAGNOSIS — E78.5 HYPERLIPIDEMIA, UNSPECIFIED HYPERLIPIDEMIA TYPE: ICD-10-CM

## 2018-02-06 DIAGNOSIS — T84.84XD PAIN DUE TO HIP JOINT PROSTHESIS, SUBSEQUENT ENCOUNTER: Primary | ICD-10-CM

## 2018-02-06 DIAGNOSIS — G47.33 OBSTRUCTIVE SLEEP APNEA ON CPAP: ICD-10-CM

## 2018-02-06 DIAGNOSIS — M19.90 ARTHRITIS: ICD-10-CM

## 2018-02-06 DIAGNOSIS — Z96.649 PAIN DUE TO HIP JOINT PROSTHESIS, SUBSEQUENT ENCOUNTER: Primary | ICD-10-CM

## 2018-02-06 PROBLEM — F41.9 ANXIETY: Status: ACTIVE | Noted: 2018-02-06

## 2018-02-06 PROBLEM — T84.84XA PAIN DUE TO HIP JOINT PROSTHESIS: Status: ACTIVE | Noted: 2018-02-06

## 2018-02-06 PROCEDURE — 37000009 HC ANESTHESIA EA ADD 15 MINS: Performed by: ORTHOPAEDIC SURGERY

## 2018-02-06 PROCEDURE — 99900104 DSU ONLY-NO CHARGE-EA ADD'L HR (STAT): Performed by: ORTHOPAEDIC SURGERY

## 2018-02-06 PROCEDURE — 87176 TISSUE HOMOGENIZATION CULTR: CPT

## 2018-02-06 PROCEDURE — 76942 ECHO GUIDE FOR BIOPSY: CPT | Performed by: ANESTHESIOLOGY

## 2018-02-06 PROCEDURE — 64448 NJX AA&/STRD FEM NRV NFS IMG: CPT | Mod: 59,RT,, | Performed by: ANESTHESIOLOGY

## 2018-02-06 PROCEDURE — 63600175 PHARM REV CODE 636 W HCPCS: Performed by: NURSE ANESTHETIST, CERTIFIED REGISTERED

## 2018-02-06 PROCEDURE — 94799 UNLISTED PULMONARY SVC/PX: CPT

## 2018-02-06 PROCEDURE — 71000039 HC RECOVERY, EACH ADD'L HOUR: Performed by: ORTHOPAEDIC SURGERY

## 2018-02-06 PROCEDURE — 25000003 PHARM REV CODE 250: Performed by: ANESTHESIOLOGY

## 2018-02-06 PROCEDURE — 25000003 PHARM REV CODE 250: Performed by: ORTHOPAEDIC SURGERY

## 2018-02-06 PROCEDURE — 97116 GAIT TRAINING THERAPY: CPT

## 2018-02-06 PROCEDURE — 0SBC0ZZ EXCISION OF RIGHT KNEE JOINT, OPEN APPROACH: ICD-10-PCS | Performed by: ORTHOPAEDIC SURGERY

## 2018-02-06 PROCEDURE — 11000001 HC ACUTE MED/SURG PRIVATE ROOM

## 2018-02-06 PROCEDURE — D9220A PRA ANESTHESIA: Mod: CRNA,,, | Performed by: NURSE ANESTHETIST, CERTIFIED REGISTERED

## 2018-02-06 PROCEDURE — C1729 CATH, DRAINAGE: HCPCS | Performed by: ORTHOPAEDIC SURGERY

## 2018-02-06 PROCEDURE — 27200664 HC NERVE BLOCK COMPLETE KIT: Performed by: NURSE ANESTHETIST, CERTIFIED REGISTERED

## 2018-02-06 PROCEDURE — 0SNC0ZZ RELEASE RIGHT KNEE JOINT, OPEN APPROACH: ICD-10-PCS | Performed by: ORTHOPAEDIC SURGERY

## 2018-02-06 PROCEDURE — 27000221 HC OXYGEN, UP TO 24 HOURS

## 2018-02-06 PROCEDURE — 27201423 OPTIME MED/SURG SUP & DEVICES STERILE SUPPLY: Performed by: ORTHOPAEDIC SURGERY

## 2018-02-06 PROCEDURE — 25000003 PHARM REV CODE 250: Performed by: NURSE ANESTHETIST, CERTIFIED REGISTERED

## 2018-02-06 PROCEDURE — 87075 CULTR BACTERIA EXCEPT BLOOD: CPT

## 2018-02-06 PROCEDURE — 88305 TISSUE EXAM BY PATHOLOGIST: CPT | Performed by: PATHOLOGY

## 2018-02-06 PROCEDURE — 99222 1ST HOSP IP/OBS MODERATE 55: CPT | Mod: AI,,, | Performed by: INTERNAL MEDICINE

## 2018-02-06 PROCEDURE — 36000706: Performed by: ORTHOPAEDIC SURGERY

## 2018-02-06 PROCEDURE — 76942 ECHO GUIDE FOR BIOPSY: CPT | Mod: 26,,, | Performed by: ANESTHESIOLOGY

## 2018-02-06 PROCEDURE — 63600175 PHARM REV CODE 636 W HCPCS: Performed by: INTERNAL MEDICINE

## 2018-02-06 PROCEDURE — 27200688 HC TRAY, SPINAL-HYPER/ ISOBARIC: Performed by: NURSE ANESTHETIST, CERTIFIED REGISTERED

## 2018-02-06 PROCEDURE — 94761 N-INVAS EAR/PLS OXIMETRY MLT: CPT

## 2018-02-06 PROCEDURE — 63600175 PHARM REV CODE 636 W HCPCS

## 2018-02-06 PROCEDURE — 87070 CULTURE OTHR SPECIMN AEROBIC: CPT

## 2018-02-06 PROCEDURE — C2626 INFUSION PUMP, NON-PROG,TEMP: HCPCS | Performed by: ANESTHESIOLOGY

## 2018-02-06 PROCEDURE — 97161 PT EVAL LOW COMPLEX 20 MIN: CPT

## 2018-02-06 PROCEDURE — 88331 PATH CONSLTJ SURG 1 BLK 1SPC: CPT | Mod: 26,,, | Performed by: PATHOLOGY

## 2018-02-06 PROCEDURE — 63600175 PHARM REV CODE 636 W HCPCS: Performed by: ANESTHESIOLOGY

## 2018-02-06 PROCEDURE — 71000033 HC RECOVERY, INTIAL HOUR: Performed by: ORTHOPAEDIC SURGERY

## 2018-02-06 PROCEDURE — 63600175 PHARM REV CODE 636 W HCPCS: Performed by: ORTHOPAEDIC SURGERY

## 2018-02-06 PROCEDURE — 36000707: Performed by: ORTHOPAEDIC SURGERY

## 2018-02-06 PROCEDURE — 37000008 HC ANESTHESIA 1ST 15 MINUTES: Performed by: ORTHOPAEDIC SURGERY

## 2018-02-06 PROCEDURE — D9220A PRA ANESTHESIA: Mod: ANES,,, | Performed by: ANESTHESIOLOGY

## 2018-02-06 PROCEDURE — S5010 5% DEXTROSE AND 0.45% SALINE: HCPCS | Performed by: ORTHOPAEDIC SURGERY

## 2018-02-06 PROCEDURE — 99900103 DSU ONLY-NO CHARGE-INITIAL HR (STAT): Performed by: ORTHOPAEDIC SURGERY

## 2018-02-06 RX ORDER — PROPOFOL 10 MG/ML
VIAL (ML) INTRAVENOUS
Status: DISCONTINUED | OUTPATIENT
Start: 2018-02-06 | End: 2018-02-06

## 2018-02-06 RX ORDER — GLYCOPYRROLATE 0.2 MG/ML
INJECTION INTRAMUSCULAR; INTRAVENOUS
Status: DISCONTINUED | OUTPATIENT
Start: 2018-02-06 | End: 2018-02-06

## 2018-02-06 RX ORDER — MIDAZOLAM HYDROCHLORIDE 1 MG/ML
INJECTION, SOLUTION INTRAMUSCULAR; INTRAVENOUS
Status: DISCONTINUED | OUTPATIENT
Start: 2018-02-06 | End: 2018-02-06

## 2018-02-06 RX ORDER — SUCCINYLCHOLINE CHLORIDE 20 MG/ML
INJECTION INTRAMUSCULAR; INTRAVENOUS
Status: DISCONTINUED | OUTPATIENT
Start: 2018-02-06 | End: 2018-02-06

## 2018-02-06 RX ORDER — SODIUM CHLORIDE, SODIUM LACTATE, POTASSIUM CHLORIDE, CALCIUM CHLORIDE 600; 310; 30; 20 MG/100ML; MG/100ML; MG/100ML; MG/100ML
INJECTION, SOLUTION INTRAVENOUS CONTINUOUS
Status: DISCONTINUED | OUTPATIENT
Start: 2018-02-06 | End: 2018-02-06

## 2018-02-06 RX ORDER — ACETAMINOPHEN 325 MG/1
650 TABLET ORAL EVERY 4 HOURS PRN
Status: DISCONTINUED | OUTPATIENT
Start: 2018-02-06 | End: 2018-02-07 | Stop reason: HOSPADM

## 2018-02-06 RX ORDER — CELECOXIB 100 MG/1
400 CAPSULE ORAL ONCE
Status: COMPLETED | OUTPATIENT
Start: 2018-02-06 | End: 2018-02-06

## 2018-02-06 RX ORDER — ONDANSETRON 2 MG/ML
4 INJECTION INTRAMUSCULAR; INTRAVENOUS EVERY 12 HOURS PRN
Status: DISCONTINUED | OUTPATIENT
Start: 2018-02-06 | End: 2018-02-07 | Stop reason: HOSPADM

## 2018-02-06 RX ORDER — ZOLPIDEM TARTRATE 5 MG/1
5 TABLET ORAL NIGHTLY PRN
Status: DISCONTINUED | OUTPATIENT
Start: 2018-02-06 | End: 2018-02-07 | Stop reason: HOSPADM

## 2018-02-06 RX ORDER — PANTOPRAZOLE SODIUM 40 MG/1
40 TABLET, DELAYED RELEASE ORAL DAILY
Status: DISCONTINUED | OUTPATIENT
Start: 2018-02-06 | End: 2018-02-07 | Stop reason: HOSPADM

## 2018-02-06 RX ORDER — DULOXETIN HYDROCHLORIDE 20 MG/1
20 CAPSULE, DELAYED RELEASE ORAL 2 TIMES DAILY
Status: DISCONTINUED | OUTPATIENT
Start: 2018-02-06 | End: 2018-02-07 | Stop reason: HOSPADM

## 2018-02-06 RX ORDER — CELECOXIB 100 MG/1
200 CAPSULE ORAL DAILY
Status: DISCONTINUED | OUTPATIENT
Start: 2018-02-07 | End: 2018-02-07 | Stop reason: HOSPADM

## 2018-02-06 RX ORDER — GABAPENTIN 400 MG/1
400 CAPSULE ORAL 3 TIMES DAILY
Status: DISCONTINUED | OUTPATIENT
Start: 2018-02-06 | End: 2018-02-07 | Stop reason: HOSPADM

## 2018-02-06 RX ORDER — NEOSTIGMINE METHYLSULFATE 1 MG/ML
INJECTION, SOLUTION INTRAVENOUS
Status: DISCONTINUED | OUTPATIENT
Start: 2018-02-06 | End: 2018-02-06

## 2018-02-06 RX ORDER — TRANEXAMIC ACID 100 MG/ML
INJECTION, SOLUTION INTRAVENOUS
Status: DISCONTINUED | OUTPATIENT
Start: 2018-02-06 | End: 2018-02-06

## 2018-02-06 RX ORDER — MEPERIDINE HYDROCHLORIDE 25 MG/ML
12.5 INJECTION INTRAMUSCULAR; INTRAVENOUS; SUBCUTANEOUS ONCE AS NEEDED
Status: DISCONTINUED | OUTPATIENT
Start: 2018-02-06 | End: 2018-02-06 | Stop reason: HOSPADM

## 2018-02-06 RX ORDER — SODIUM CHLORIDE 0.9 % (FLUSH) 0.9 %
5 SYRINGE (ML) INJECTION
Status: DISCONTINUED | OUTPATIENT
Start: 2018-02-06 | End: 2018-02-07 | Stop reason: HOSPADM

## 2018-02-06 RX ORDER — ATORVASTATIN CALCIUM 40 MG/1
40 TABLET, FILM COATED ORAL NIGHTLY
Status: DISCONTINUED | OUTPATIENT
Start: 2018-02-06 | End: 2018-02-07 | Stop reason: HOSPADM

## 2018-02-06 RX ORDER — OXYCODONE HYDROCHLORIDE 10 MG/1
10 TABLET ORAL
Status: DISCONTINUED | OUTPATIENT
Start: 2018-02-06 | End: 2018-02-06 | Stop reason: SDUPTHER

## 2018-02-06 RX ORDER — DEXTROSE MONOHYDRATE AND SODIUM CHLORIDE 5; .45 G/100ML; G/100ML
INJECTION, SOLUTION INTRAVENOUS CONTINUOUS
Status: DISCONTINUED | OUTPATIENT
Start: 2018-02-06 | End: 2018-02-07 | Stop reason: HOSPADM

## 2018-02-06 RX ORDER — KETOROLAC TROMETHAMINE 30 MG/ML
INJECTION, SOLUTION INTRAMUSCULAR; INTRAVENOUS
Status: DISCONTINUED | OUTPATIENT
Start: 2018-02-06 | End: 2018-02-06

## 2018-02-06 RX ORDER — OXYCODONE HYDROCHLORIDE 10 MG/1
10 TABLET ORAL EVERY 4 HOURS PRN
Status: DISCONTINUED | OUTPATIENT
Start: 2018-02-06 | End: 2018-02-07 | Stop reason: HOSPADM

## 2018-02-06 RX ORDER — ONDANSETRON 2 MG/ML
4 INJECTION INTRAMUSCULAR; INTRAVENOUS DAILY PRN
Status: DISCONTINUED | OUTPATIENT
Start: 2018-02-06 | End: 2018-02-06 | Stop reason: HOSPADM

## 2018-02-06 RX ORDER — MUPIROCIN 20 MG/G
OINTMENT TOPICAL
Status: COMPLETED | OUTPATIENT
Start: 2018-02-06 | End: 2018-02-06

## 2018-02-06 RX ORDER — MUPIROCIN 20 MG/G
1 OINTMENT TOPICAL 2 TIMES DAILY
Status: DISCONTINUED | OUTPATIENT
Start: 2018-02-06 | End: 2018-02-07 | Stop reason: HOSPADM

## 2018-02-06 RX ORDER — OXYBUTYNIN CHLORIDE 5 MG/1
5 TABLET ORAL 3 TIMES DAILY
Status: DISCONTINUED | OUTPATIENT
Start: 2018-02-06 | End: 2018-02-07 | Stop reason: HOSPADM

## 2018-02-06 RX ORDER — LIDOCAINE HCL/PF 100 MG/5ML
SYRINGE (ML) INTRAVENOUS
Status: DISCONTINUED | OUTPATIENT
Start: 2018-02-06 | End: 2018-02-06

## 2018-02-06 RX ORDER — BISACODYL 10 MG
10 SUPPOSITORY, RECTAL RECTAL DAILY PRN
Status: DISCONTINUED | OUTPATIENT
Start: 2018-02-06 | End: 2018-02-07 | Stop reason: HOSPADM

## 2018-02-06 RX ORDER — LORAZEPAM 2 MG/ML
0.25 INJECTION INTRAMUSCULAR
Status: DISCONTINUED | OUTPATIENT
Start: 2018-02-06 | End: 2018-02-06 | Stop reason: HOSPADM

## 2018-02-06 RX ORDER — KETAMINE HYDROCHLORIDE 10 MG/ML
INJECTION, SOLUTION INTRAMUSCULAR; INTRAVENOUS
Status: DISCONTINUED | OUTPATIENT
Start: 2018-02-06 | End: 2018-02-06

## 2018-02-06 RX ORDER — ROCURONIUM BROMIDE 10 MG/ML
INJECTION, SOLUTION INTRAVENOUS
Status: DISCONTINUED | OUTPATIENT
Start: 2018-02-06 | End: 2018-02-06

## 2018-02-06 RX ORDER — CELECOXIB 100 MG/1
200 CAPSULE ORAL 2 TIMES DAILY
Status: DISCONTINUED | OUTPATIENT
Start: 2018-02-06 | End: 2018-02-07 | Stop reason: HOSPADM

## 2018-02-06 RX ORDER — CEFAZOLIN SODIUM 1 G/3ML
2 INJECTION, POWDER, FOR SOLUTION INTRAMUSCULAR; INTRAVENOUS
Status: DISCONTINUED | OUTPATIENT
Start: 2018-02-06 | End: 2018-02-06

## 2018-02-06 RX ORDER — FENTANYL CITRATE 50 UG/ML
INJECTION, SOLUTION INTRAMUSCULAR; INTRAVENOUS
Status: DISCONTINUED | OUTPATIENT
Start: 2018-02-06 | End: 2018-02-06

## 2018-02-06 RX ORDER — FAMOTIDINE 20 MG/1
20 TABLET, FILM COATED ORAL 2 TIMES DAILY
Status: DISCONTINUED | OUTPATIENT
Start: 2018-02-06 | End: 2018-02-07 | Stop reason: HOSPADM

## 2018-02-06 RX ORDER — DEXAMETHASONE SODIUM PHOSPHATE 4 MG/ML
INJECTION, SOLUTION INTRA-ARTICULAR; INTRALESIONAL; INTRAMUSCULAR; INTRAVENOUS; SOFT TISSUE
Status: DISCONTINUED | OUTPATIENT
Start: 2018-02-06 | End: 2018-02-06

## 2018-02-06 RX ORDER — OXYCODONE HYDROCHLORIDE 5 MG/1
5 TABLET ORAL
Status: DISCONTINUED | OUTPATIENT
Start: 2018-02-06 | End: 2018-02-06 | Stop reason: SDUPTHER

## 2018-02-06 RX ORDER — SODIUM CHLORIDE 0.9 % (FLUSH) 0.9 %
3 SYRINGE (ML) INJECTION
Status: DISCONTINUED | OUTPATIENT
Start: 2018-02-06 | End: 2018-02-07 | Stop reason: HOSPADM

## 2018-02-06 RX ORDER — ACETAMINOPHEN 325 MG/1
650 TABLET ORAL EVERY 4 HOURS
Status: DISCONTINUED | OUTPATIENT
Start: 2018-02-06 | End: 2018-02-07 | Stop reason: HOSPADM

## 2018-02-06 RX ORDER — HYDROMORPHONE HYDROCHLORIDE 2 MG/ML
INJECTION, SOLUTION INTRAMUSCULAR; INTRAVENOUS; SUBCUTANEOUS
Status: DISCONTINUED | OUTPATIENT
Start: 2018-02-06 | End: 2018-02-06

## 2018-02-06 RX ORDER — HYDROMORPHONE HYDROCHLORIDE 2 MG/ML
0.2 INJECTION, SOLUTION INTRAMUSCULAR; INTRAVENOUS; SUBCUTANEOUS EVERY 5 MIN PRN
Status: DISCONTINUED | OUTPATIENT
Start: 2018-02-06 | End: 2018-02-06 | Stop reason: HOSPADM

## 2018-02-06 RX ORDER — LIDOCAINE HYDROCHLORIDE 10 MG/ML
0.5 INJECTION, SOLUTION EPIDURAL; INFILTRATION; INTRACAUDAL; PERINEURAL ONCE
Status: COMPLETED | OUTPATIENT
Start: 2018-02-06 | End: 2018-02-06

## 2018-02-06 RX ORDER — PREGABALIN 75 MG/1
75 CAPSULE ORAL 2 TIMES DAILY
Status: DISCONTINUED | OUTPATIENT
Start: 2018-02-06 | End: 2018-02-07 | Stop reason: HOSPADM

## 2018-02-06 RX ORDER — ASPIRIN 325 MG
325 TABLET ORAL 2 TIMES DAILY
Status: DISCONTINUED | OUTPATIENT
Start: 2018-02-06 | End: 2018-02-07 | Stop reason: HOSPADM

## 2018-02-06 RX ORDER — CEFAZOLIN SODIUM 1 G/3ML
2 INJECTION, POWDER, FOR SOLUTION INTRAMUSCULAR; INTRAVENOUS
Status: COMPLETED | OUTPATIENT
Start: 2018-02-06 | End: 2018-02-06

## 2018-02-06 RX ORDER — METHOCARBAMOL 500 MG/1
500 TABLET, FILM COATED ORAL 3 TIMES DAILY
Status: DISCONTINUED | OUTPATIENT
Start: 2018-02-06 | End: 2018-02-07 | Stop reason: HOSPADM

## 2018-02-06 RX ORDER — ONDANSETRON HYDROCHLORIDE 2 MG/ML
INJECTION, SOLUTION INTRAMUSCULAR; INTRAVENOUS
Status: DISCONTINUED | OUTPATIENT
Start: 2018-02-06 | End: 2018-02-06

## 2018-02-06 RX ORDER — ROPIVACAINE HYDROCHLORIDE 2 MG/ML
8 INJECTION, SOLUTION EPIDURAL; INFILTRATION; PERINEURAL CONTINUOUS
Status: DISCONTINUED | OUTPATIENT
Start: 2018-02-06 | End: 2018-02-06 | Stop reason: SDUPTHER

## 2018-02-06 RX ADMIN — PROPOFOL 150 MG: 10 INJECTION, EMULSION INTRAVENOUS at 08:02

## 2018-02-06 RX ADMIN — EPHEDRINE SULFATE 10 MG: 50 INJECTION, SOLUTION INTRAMUSCULAR; INTRAVENOUS; SUBCUTANEOUS at 09:02

## 2018-02-06 RX ADMIN — GABAPENTIN 400 MG: 400 CAPSULE ORAL at 09:02

## 2018-02-06 RX ADMIN — SODIUM CHLORIDE, SODIUM LACTATE, POTASSIUM CHLORIDE, AND CALCIUM CHLORIDE: .6; .31; .03; .02 INJECTION, SOLUTION INTRAVENOUS at 06:02

## 2018-02-06 RX ADMIN — EPHEDRINE SULFATE 5 MG: 50 INJECTION, SOLUTION INTRAMUSCULAR; INTRAVENOUS; SUBCUTANEOUS at 08:02

## 2018-02-06 RX ADMIN — OXYBUTYNIN CHLORIDE 5 MG: 5 TABLET ORAL at 02:02

## 2018-02-06 RX ADMIN — SODIUM CHLORIDE, SODIUM LACTATE, POTASSIUM CHLORIDE, AND CALCIUM CHLORIDE: .6; .31; .03; .02 INJECTION, SOLUTION INTRAVENOUS at 08:02

## 2018-02-06 RX ADMIN — CELECOXIB 400 MG: 100 CAPSULE ORAL at 10:02

## 2018-02-06 RX ADMIN — ROPIVACAINE HYDROCHLORIDE: 2 INJECTION, SOLUTION EPIDURAL; INFILTRATION at 10:02

## 2018-02-06 RX ADMIN — FAMOTIDINE 20 MG: 20 TABLET, FILM COATED ORAL at 09:02

## 2018-02-06 RX ADMIN — SUCCINYLCHOLINE CHLORIDE 120 MG: 20 INJECTION, SOLUTION INTRAMUSCULAR; INTRAVENOUS at 08:02

## 2018-02-06 RX ADMIN — DEXAMETHASONE SODIUM PHOSPHATE 8 MG: 4 INJECTION, SOLUTION INTRAMUSCULAR; INTRAVENOUS at 08:02

## 2018-02-06 RX ADMIN — KETOROLAC TROMETHAMINE 30 MG: 30 INJECTION, SOLUTION INTRAMUSCULAR; INTRAVENOUS at 09:02

## 2018-02-06 RX ADMIN — MUPIROCIN 1 G: 20 OINTMENT TOPICAL at 09:02

## 2018-02-06 RX ADMIN — OXYBUTYNIN CHLORIDE 5 MG: 5 TABLET ORAL at 09:02

## 2018-02-06 RX ADMIN — ATORVASTATIN CALCIUM 40 MG: 40 TABLET, FILM COATED ORAL at 09:02

## 2018-02-06 RX ADMIN — LIDOCAINE HYDROCHLORIDE 10 MG: 10 INJECTION, SOLUTION EPIDURAL; INFILTRATION; INTRACAUDAL; PERINEURAL at 06:02

## 2018-02-06 RX ADMIN — METHOCARBAMOL 500 MG: 500 TABLET ORAL at 02:02

## 2018-02-06 RX ADMIN — GABAPENTIN 400 MG: 400 CAPSULE ORAL at 02:02

## 2018-02-06 RX ADMIN — FENTANYL CITRATE 50 MCG: 50 INJECTION, SOLUTION INTRAMUSCULAR; INTRAVENOUS at 07:02

## 2018-02-06 RX ADMIN — NEOSTIGMINE METHYLSULFATE 4 MG: 1 INJECTION INTRAVENOUS at 09:02

## 2018-02-06 RX ADMIN — OXYCODONE HYDROCHLORIDE 10 MG: 10 TABLET ORAL at 09:02

## 2018-02-06 RX ADMIN — CEFAZOLIN 2 G: 1 INJECTION, POWDER, FOR SOLUTION INTRAVENOUS at 02:02

## 2018-02-06 RX ADMIN — MUPIROCIN: 20 OINTMENT TOPICAL at 07:02

## 2018-02-06 RX ADMIN — KETAMINE HYDROCHLORIDE 50 MG: 10 INJECTION, SOLUTION INTRAMUSCULAR; INTRAVENOUS at 08:02

## 2018-02-06 RX ADMIN — ACETAMINOPHEN 650 MG: 325 TABLET, FILM COATED ORAL at 02:02

## 2018-02-06 RX ADMIN — HYDROMORPHONE HYDROCHLORIDE 1 MG: 2 INJECTION INTRAMUSCULAR; INTRAVENOUS; SUBCUTANEOUS at 09:02

## 2018-02-06 RX ADMIN — ONDANSETRON 4 MG: 2 INJECTION, SOLUTION INTRAMUSCULAR; INTRAVENOUS at 08:02

## 2018-02-06 RX ADMIN — HYDROMORPHONE HYDROCHLORIDE 1 MG: 2 INJECTION INTRAMUSCULAR; INTRAVENOUS; SUBCUTANEOUS at 08:02

## 2018-02-06 RX ADMIN — GLYCOPYRROLATE 0.4 MG: 0.2 INJECTION, SOLUTION INTRAMUSCULAR; INTRAVENOUS at 09:02

## 2018-02-06 RX ADMIN — TRANEXAMIC ACID 1000 MG: 100 INJECTION, SOLUTION INTRAVENOUS at 08:02

## 2018-02-06 RX ADMIN — ROCURONIUM BROMIDE 40 MG: 10 INJECTION, SOLUTION INTRAVENOUS at 08:02

## 2018-02-06 RX ADMIN — CELECOXIB 200 MG: 100 CAPSULE ORAL at 09:02

## 2018-02-06 RX ADMIN — VANCOMYCIN HYDROCHLORIDE 1 G: 1 INJECTION, POWDER, LYOPHILIZED, FOR SOLUTION INTRAVENOUS at 06:02

## 2018-02-06 RX ADMIN — ACETAMINOPHEN 650 MG: 325 TABLET, FILM COATED ORAL at 10:02

## 2018-02-06 RX ADMIN — CELECOXIB 200 MG: 100 CAPSULE ORAL at 02:02

## 2018-02-06 RX ADMIN — DULOXETINE HYDROCHLORIDE 20 MG: 20 CAPSULE, DELAYED RELEASE ORAL at 09:02

## 2018-02-06 RX ADMIN — DEXTROSE AND SODIUM CHLORIDE: 5; .45 INJECTION, SOLUTION INTRAVENOUS at 10:02

## 2018-02-06 RX ADMIN — ROCURONIUM BROMIDE 10 MG: 10 INJECTION, SOLUTION INTRAVENOUS at 08:02

## 2018-02-06 RX ADMIN — EPHEDRINE SULFATE 25 MG: 50 INJECTION, SOLUTION INTRAMUSCULAR; INTRAVENOUS; SUBCUTANEOUS at 08:02

## 2018-02-06 RX ADMIN — ASPIRIN 325 MG ORAL TABLET 325 MG: 325 PILL ORAL at 09:02

## 2018-02-06 RX ADMIN — PREGABALIN 75 MG: 75 CAPSULE ORAL at 09:02

## 2018-02-06 RX ADMIN — LIDOCAINE HYDROCHLORIDE 50 MG: 20 INJECTION, SOLUTION INTRAVENOUS at 08:02

## 2018-02-06 RX ADMIN — PREGABALIN 75 MG: 75 CAPSULE ORAL at 10:02

## 2018-02-06 RX ADMIN — MIDAZOLAM 2 MG: 1 INJECTION INTRAMUSCULAR; INTRAVENOUS at 07:02

## 2018-02-06 RX ADMIN — METHOCARBAMOL 500 MG: 500 TABLET ORAL at 09:02

## 2018-02-06 NOTE — ANESTHESIA POSTPROCEDURE EVALUATION
"Anesthesia Post Evaluation    Patient: Jorgito Mora III    Procedure(s) Performed: Procedure(s) (LRB):  REVISION-ARTHROPLASTY-KNEE (Right)    Final Anesthesia Type: general  Patient location during evaluation: PACU  Patient participation: Yes- Able to Participate  Level of consciousness: awake and alert  Post-procedure vital signs: reviewed and stable  Pain management: adequate  Airway patency: patent  PONV status at discharge: No PONV  Anesthetic complications: no      Cardiovascular status: blood pressure returned to baseline and hemodynamically stable  Respiratory status: unassisted  Hydration status: euvolemic  Follow-up not needed.        Visit Vitals  /71 (BP Location: Right arm, Patient Position: Lying)   Pulse 92   Temp 37.3 °C (99.1 °F) (Skin)   Resp 16   Ht 5' 10.5" (1.791 m)   Wt 110.2 kg (243 lb)   SpO2 (!) 94%   BMI 34.37 kg/m²       Pain/Jenifer Score: Pain Assessment Performed: Yes (2/6/2018  6:37 AM)  Presence of Pain: complains of pain/discomfort (2/6/2018  6:37 AM)  Pain Rating Prior to Med Admin: 4 (2/6/2018  6:37 AM)      "

## 2018-02-06 NOTE — ANESTHESIA PROCEDURE NOTES
Peripheral    Patient location during procedure: pre-op   Block not for primary anesthetic.  Reason for block: at surgeon's request and post-op pain management   Post-op Pain Location: Right knee pain  Start time: 2/6/2018 7:31 AM  Timeout: 2/6/2018 7:30 AM   End time: 2/6/2018 7:40 AM  Staffing  Anesthesiologist: SACHIN MOLINA  Performed: anesthesiologist   Preanesthetic Checklist  Completed: patient identified, site marked, surgical consent, pre-op evaluation, timeout performed, IV checked, risks and benefits discussed and monitors and equipment checked  Peripheral Block  Patient position: supine  Prep: ChloraPrep  Block type: adductor canal  Laterality: right  Injection technique: continuous  Needle  Needle type: Tuohy   Needle gauge: 18 G  Needle length: 4 in  Needle localization: ultrasound guidance  Catheter type: non-stimulating  Catheter size: 20 G  Test dose: lidocaine 1.5% with Epi 1-to-200,000 and negative   -ultrasound image captured on disc.  Assessment  Injection assessment: negative aspiration, negative parasthesia and local visualized surrounding nerve  Paresthesia pain: none  Heart rate change: no  Slow fractionated injection: no  Medications:  Bolus administered: 30 mL of 0.5 ropivacaine  Additional Notes  Right continuous adductor canal block placed with 30 cc0.5% Naropin with 8 mg Decadron.  Pt tolerated well.

## 2018-02-06 NOTE — PLAN OF CARE
Problem: Physical Therapy Goal  Goal: Physical Therapy Goal  Goals to be met by: 2018     Patient will increase functional independence with mobility by performin. Supine to sit with Stand-by Assistance  2. Sit to supine with Stand-by Assistance  3. Sit to stand transfer with Stand-by Assistance  4. Bed to chair transfer with Stand-by Assistance using Rolling Walker  5. Gait  x 250 feet with Stand-by Assistance using Rolling Walker.   6. Lower extremity exercise program x10 reps per handout, with supervision    Outcome: Ongoing (interventions implemented as appropriate)  PT evaluation complete. Recommend home with HHPT at discharge.

## 2018-02-06 NOTE — PROGRESS NOTES
02/06/18 1615   Patient Assessment/Suction   Level of Consciousness (AVPU) alert   PRE-TX-O2-ETCO2   O2 Device (Oxygen Therapy) nasal cannula   $ Is the patient on Low Flow Oxygen? Yes   Flow (L/min) 2   SpO2 (!) 94 %   Pulse Oximetry Type Intermittent   $ Pulse Oximetry - Multiple Charge Pulse Oximetry - Multiple   Incentive Spirometer   $ Incentive Spirometer Charges done with encouragement   Administration (Incentive Spirometer) done with encouragement   Number of Repetitions (Incentive Spirometer) 10   Level (mL) (Incentive Spirometer) 2250   Patient Tolerance good

## 2018-02-06 NOTE — PT/OT/SLP EVAL
"Physical Therapy Evaluation    Patient Name:  Jorgito Mora III   MRN:  [id number]    Recommendations:     Discharge Recommendations:  home health PT   Discharge Equipment Recommendations: none   Barriers to discharge: None    Assessment:     Jorgito Mora III is a 67 y.o. male admitted with a medical diagnosis of open lysis of adhesions R TKA  He presents with the following impairments/functional limitations:  impaired endurance, impaired functional mobilty, gait instability, weakness, impaired balance, decreased lower extremity function, pain, decreased ROM, impaired joint extensibility, orthopedic precautions. Pt with slight gait instability 2/2 natural posture and RLE pain.    Rehab Prognosis:  Good; patient would benefit from acute skilled PT services to address these deficits and reach maximum level of function.      Recent Surgery: Procedure(s) (LRB):  REVISION-ARTHROPLASTY-KNEE (Right) Day of Surgery    Plan:     During this hospitalization, patient to be seen BID (QD Sat-Sun) to address the above listed problems via gait training, therapeutic activities, therapeutic exercises  · Plan of Care Expires:  03/07/18   Plan of Care Reviewed with: patient, spouse    Subjective     Communicated with RN prior to session.  Patient found supine in bed upon PT entry to room, agreeable to evaluation.      Chief Complaint: R knee pain  Patient comments/goals: "I feel more mobile than before surgery."  Pain/Comfort:  · Pain Rating 1: 4/10  · Location - Side 1: Right  · Location - Orientation 1: generalized  · Location 1: knee  · Pain Addressed 1: Reposition, Distraction  · Pain Rating Post-Intervention 1: 4/10    Patients cultural, spiritual, Cheondoism conflicts given the current situation: None    Living Environment:  Pt lives with his wife in a [facility] with a threshold to enter.   Prior to admission, patients level of function was mod I using a cane for ambulation.  Patient has the following equipment: walker, " rolling, cane, quad, bedside commode.  Upon discharge, patient will have assistance from his wife.    Objective:     Patient found with: peripheral IV, perineural catheter, SCD     General Precautions: Standard, fall   Orthopedic Precautions:RLE weight bearing as tolerated   Braces: N/A     Exams:  · Cognitive Exam:  Patient is oriented to Person, Place, Time and Situation   · Gross Motor Coordination:  WFL  · Postural Exam:  Patient presented with the following abnormalities:    · -       Rounded shoulders  · -       Forward head  · -       Posterior pelvic tilt  · Sensation:    · -       Intact  · RUE ROM: WNL  · RUE Strength: WNL  · LUE ROM: WNL  · LUE Strength: WNL  · RLE ROM: -10 to 100 degrees at knee  · RLE Strength: 3-/5 throughout  · LLE ROM: WNL  · LLE Strength: WNL    Functional Mobility:  · Bed Mobility:  · Supine to Sit: minimum assistance for RLE  · Sit to Supine: minimum assistance for RLE  · Transfers  · Sit to Stand:  contact guard assistance with rolling walker  · Gait:  · Pt ambulated 100 feet with a rolling walker and contact guard assist. Pt with forward flexed posture and decreased step length. Cues given for upright posture and to maintain walker closer to BHARATI for safety.     AM-PAC 6 CLICK MOBILITY  Total Score:17     Patient left supine with all lines intact and call button in reach.    GOALS:    Physical Therapy Goals        Problem: Physical Therapy Goal    Goal Priority Disciplines Outcome Goal Variances Interventions   Physical Therapy Goal     PT/OT, PT Ongoing (interventions implemented as appropriate)     Description:  Goals to be met by: 2018     Patient will increase functional independence with mobility by performin. Supine to sit with Stand-by Assistance  2. Sit to supine with Stand-by Assistance  3. Sit to stand transfer with Stand-by Assistance  4. Bed to chair transfer with Stand-by Assistance using Rolling Walker  5. Gait  x 250 feet with Stand-by Assistance using  Rolling Walker.   6. Lower extremity exercise program x10 reps per handout, with supervision                      History:     Past Medical History:   Diagnosis Date    Anxiety     Arthritis     Cancer 2005    Prostate    Depression     Encounter for blood transfusion     Hyperlipemia     Neuropathy     Obstructive sleep apnea on CPAP     PTSD (post-traumatic stress disorder)        Past Surgical History:   Procedure Laterality Date    BACK SURGERY      burnt nerves      ELBOW SURGERY  2010, 2008    left, right    GALLBLADDER SURGERY      JOINT REPLACEMENT  2007, 2012    Bilateral knees    KNEE SURGERY  2001, 2007    left and right knee    Neck surgery with plate      Crushed C2-3-4    prostate cancer      SHOULDER SURGERY  2004    right shoulder       Clinical Decision Making:     History  Co-morbidities and personal factors that may impact the plan of care Examination  Body Structures and Functions, activity limitations and participation restrictions that may impact the plan of care Clinical Presentation   Decision Making/ Complexity Score   Co-morbidities:   [] Time since onset of injury / illness / exacerbation  [] Status of current condition  []Patient's cognitive status and safety concerns    [] Multiple Medical Problems (see med hx)  Personal Factors:   [] Patient's age  [] Prior Level of function   [] Patient's home situation (environment and family support)  [] Patient's level of motivation  [] Expected progression of patient      HISTORY:(criteria)    [] 78346 - no personal factors/history    [] 33539 - has 1-2 personal factor/comorbidity     [] 51185 - has >3 personal factor/comorbidity     Body Regions:  [] Objective examination findings  [] Head     []  Neck  [] Trunk   [] Upper Extremity  [] Lower Extremity    Body Systems:  [] For communication ability, affect, cognition, language, and learning style: the assessment of the ability to make needs known, consciousness, orientation  (person, place, and time), expected emotional /behavioral responses, and learning preferences (eg, learning barriers, education  needs)  [] For the neuromuscular system: a general assessment of gross coordinated movement (eg, balance, gait, locomotion, transfers, and transitions) and motor function  (motor control and motor learning)  [] For the musculoskeletal system: the assessment of gross symmetry, gross range of motion, gross strength, height, and weight  [] For the integumentary system: the assessment of pliability(texture), presence of scar formation, skin color, and skin integrity  [] For cardiovascular/pulmonary system: the assessment of heart rate, respiratory rate, blood pressure, and edema     Activity limitations:    [] Patient's cognitive status and saf ety concerns          [] Status of current condition      [] Weight bearing restriction  [] Cardiopulmunary Restriction    Participation Restrictions:   [] Goals and goal agreement with the patient     [] Rehab potential (prognosis) and probable outcome      Examination of Body System: (criteria)    [] 13918 - addressing 1-2 elements    [] 79738 - addressing a total of 3 or more elements     [] 96662 -  Addressing a total of 4 or more elements         Clinical Presentation: (criteria)  Choose one     On examination of body system using standardized tests and measures patient presents with (CHOOSE ONE) elements from any of the following: body structures and functions, activity limitations, and/or participation restrictions.  Leading to a clinical presentation that is considered (CHOOSE ONE)                              Clinical Decision Making  (Eval Complexity):  Choose One     Time Tracking:     PT Received On: 02/06/18  PT Start Time: 1345     PT Stop Time: 1404  PT Total Time (min): 19 min     Billable Minutes: Evaluation 10 and Gait Training 9      Jessica LeJeune, PT, DPT

## 2018-02-06 NOTE — OP NOTE
DATE OF PROCEDURE:  02/06/2018    PROCEDURE:  Right knee exploration, extensive debridement, and lysis of   adhesions.    PREOPERATIVE DIAGNOSIS:  Painful right total knee.    POSTOPERATIVE DIAGNOSES:  Synovitis and adhesions, right total knee.    ANESTHESIA:  General.    SURGEON:  Dr. Chase.    ASSISTANT:  Logan Wood.    PROCEDURE IN DETAIL:  The patient was brought to the Operating Room and placed   under general anesthesia.  Right leg prepped and draped in satisfactory manner,   exsanguinated with the Esmarch and elevated the tourniquet to 300.  I did use a   leg erickson.  Prior to surgery, he did not have quite full extension and his knee   flexed just past 90, but it was generally a stiff knee.  The prior aspirates   were negative.  All the blood work was negative, bone scan was negative, so no   significant sign of infection preoperatively.  We used his previous midline   scar, incised the skin, had to make a generous scar because of the stiffness of   his extensor mechanism, did our medial arthrotomy and then we entered the knee,   we took culture of the first fluid, was typical joint fluid, did not look   grossly infected and then we started exploring the joint, had a lot of stiff   scar with synovium and the synovium had little granulation-type tissue with it,   so we started cleaning up our synovium.  We sent a piece of that for frozen   section.  The part looked suspicious and then we took more cultures of the   synovium and then we started lysing our adhesions and doing a synovectomy, so we   ended up doing a synovectomy all around the knee.  We lysed adhesions around   the medial side, around the medial femoral condyle, suprapatellar pouch, went to   the lateral side along underneath the patella tendon, significant scar under   the tendon and around the patella and it worked our way around laterally until   we had lysis of these adhesions and we did get some kind of exposure of the   knee.  We took  a little work to get that far, again nothing that looked loose or   infected.  The parts were then examined.  The femur did not appear to be loose.    The tibial tray did not appear to be loose.  The patella appeared to be in   good position and not loose.  The poly was in good position and when I examined   the surface, there was no delamination, no  signs of excessive wear of the poly   and it seemed to track nicely.  I did not see any issues with tracking and the   alignment of the prosthesis.  We took a total of three cultures, sent histology   then and we had a frozen section, then irrigated out the knee after we had   cleaned it out.  We looked at the knee and we expected to see a Alla   Triathlon component, but in reality once we looked at the component, this was a   Scorpio knee.  The rep checked around, he did not have poly anywhere in town to   replace that poly.  So we did not do a poly exchange since we had no parts to   put back in.  After irrigating, we waited for pathology.  There report indicated   less than 5 white cells per high power field, actually white cells were very   rare, so there was no indication of infection on the frozen section, so I felt   like doing the synovectomy and very significant adhesions of the knee now, felt   looser, tracked better, extended better, flexed much better than it was prior to   our surgery and I did not feel necessary to change all the components out to   put a revision and everything.  Of course that would scars knee up even worse,   so we irrigated total of 3 liters, let our tourniquet down and we elected to   close the knee just doing a lysis of adhesions feeling that these adhesions were   enough reason to cause pain and stiffness in the knee, so we closed the medial   retinaculum with #1 Vicryl, we closed our VMO with #2 Tycron, closed subQ with   2-0 Vicryl and closed the skin with running subcuticular Monocryl and   Steri-Strips.  We did leave one  drain inside the knee, left out through a   separate stab wound and there was not a lot of bleeding in the knee and then   applied a bulky dressing.  So I think a diagnosis was adhesions in the knee   rather than an infection or any hardware problems in the knee.          /ls [id number] blank(s)        MELIDA/CHEMA  dd: 02/06/2018 09:48:59 (CST)  td: 02/06/2018 12:34:08 (CST)  Doc ID   #[id number]  Job ID #[id number]    CC:

## 2018-02-06 NOTE — H&P
PCP: Primary Doctor No    History & Physical    Chief Complaint: s/p right knee revision arthroplasty    History of Present Illness:  Patient is a 67 y.o. male admitted to Hospitalist Service from [facility] s/p right knee revision arthroplasty performed by Dr. Chase. Patient reportedly has past medical history significant for OA, hyperlipidemia, depression, VASILE (on CPAP) and PTSD. Post-operatively, patient denied chest pain, shortness of breath, abdominal pain, nausea, vomiting, headache, vision changes, focal neuro-deficits, cough or fever.    Past Medical History:   Diagnosis Date    Anxiety     Arthritis     Cancer 2005    Prostate    Depression     Encounter for blood transfusion     Hyperlipemia     Neuropathy     Obstructive sleep apnea on CPAP     PTSD (post-traumatic stress disorder)      Past Surgical History:   Procedure Laterality Date    BACK SURGERY      burnt nerves      ELBOW SURGERY  2010, 2008    left, right    GALLBLADDER SURGERY      JOINT REPLACEMENT  2007, 2012    Bilateral knees    KNEE SURGERY  2001, 2007    left and right knee    Neck surgery with plate      Crushed C2-3-4    prostate cancer      SHOULDER SURGERY  2004    right shoulder     History reviewed. No pertinent family history.  Social History   Substance Use Topics    Smoking status: Former Smoker     Packs/day: 0.50     Years: 10.00     Types: Cigarettes    Smokeless tobacco: Never Used    Alcohol use Yes      Comment: rare socially       Review of patient's allergies indicates:  No Known Allergies  PTA Medications   Medication Sig    atorvastatin (LIPITOR) 80 MG tablet Take 40 mg by mouth every evening.     busPIRone (BUSPAR) 10 MG tablet Take 20 mg by mouth 3 (three) times daily.     DULoxetine (CYMBALTA) 20 MG capsule Take 20 mg by mouth 2 (two) times daily.    gabapentin (NEURONTIN) 400 MG capsule Take 400 mg by mouth 3 (three) times daily.    ketoconazole (NIZORAL) 2 % shampoo Apply 1 application  topically twice a week.     methocarbamol (ROBAXIN) 750 MG Tab Take 500 mg by mouth 3 (three) times daily.    oxybutynin (DITROPAN) 5 MG Tab Take 5 mg by mouth 3 (three) times daily.    pantoprazole (PROTONIX) 40 MG tablet Take 40 mg by mouth once daily.    urea (CARMOL) 10 % cream Apply 1 application topically as needed.     VITAMIN B COMPLEX ORAL Take 1 tablet by mouth Daily.     [DISCONTINUED] ammonium lactate 12 % Crea Apply 1 application topically.     BED UNDERPADS MISC by Misc.(Non-Drug; Combo Route) route.    clotrimazole (LOTRIMIN) 1 % Soln Apply 1 application topically 2 (two) times daily.     DIAPER,BRIEF,ADULT, DISPOSABLE (DEPEND UNDERWEAR FOR MEN L-XL MISC) by Misc.(Non-Drug; Combo Route) route.    INCONTINENCE PAD,LINER,DISP (POISE PADS EXTRA PLUS MISC) by Misc.(Non-Drug; Combo Route) route.    naproxen (NAPROSYN) 500 MG tablet Take 500 mg by mouth 2 (two) times daily with meals.    oxycodone-acetaminophen (PERCOCET)  mg per tablet Take 1 tablet by mouth as needed for Pain.    [DISCONTINUED] FLUZONE HIGH-DOSE 2017-18, PF, 180 mcg/0.5 mL vaccine ADM 0.5ML IM UTD    [DISCONTINUED] PREVNAR 13, PF, 0.5 mL Syrg ADM 0.5ML IM UTD     Review of Systems:  Constitutional: no fever or chills  Eyes: no visual changes  Ears, nose, mouth, throat, and face: no nasal congestion or sore throat  Respiratory: no cough or shorness of breath  Cardiovascular: no chest pain or palpitations  Gastrointestinal: no nausea or vomiting, no abdominal pain or change in bowel habits  Genitourinary: no hematuria or dysuria  Integument/breast: no rash or pruritis  Hematologic/lymphatic: no easy bruising or lymphadenopathy  Musculoskeletal: see HPI  Neurological: no seizures or tremors.  Behavioral/Psych: no auditory or visual hallucinations  Endocrine: no heat or cold intolerance     OBJECTIVE:     Vital Signs (Most Recent)  Temp: 98.7 °F (37.1 °C) (02/06/18 1129)  Pulse: 101 (02/06/18 1129)  Resp: 18 (02/06/18  1129)  BP: 118/62 (02/06/18 1129)  SpO2: 95 % (02/06/18 1129)    Physical Exam:  General appearance: well developed, appears stated age  Head: normocephalic, atraumatic  Eyes:  conjunctivae/corneas clear. PERRL.  Nose: Nares normal. Septum midline.  Throat: lips, mucosa, and tongue normal; teeth and gums normal, no throat erythema.  Neck: supple, symmetrical, trachea midline, no JVD and thyroid not enlarged, symmetric, no tenderness/mass/nodules  Lungs:  clear to auscultation bilaterally and normal respiratory effort  Chest wall: no tenderness  Heart: regular rate and rhythm, S1, S2 normal, no murmur, click, rub or gallop  Abdomen: soft, non-tender non-distented; bowel sounds normal; no masses,  no organomegaly  Extremities: no cyanosis, clubbing or edema. Right knee dressing C/D/I.  Pulses: 2+ and symmetric  Skin: Skin color, texture, turgor normal. No rashes or lesions.  Lymph nodes: Cervical, supraclavicular, and axillary nodes normal.  Neurologic: Normal strength and tone. No focal numbness or weakness. CNII-XII intact.      Laboratory:   CBC: No results for input(s): WBC, RBC, HGB, HCT, PLT, MCV, MCH, MCHC in the last 168 hours.  CMP: No results for input(s): GLU, CALCIUM, ALBUMIN, PROT, NA, K, CO2, CL, BUN, CREATININE, ALKPHOS, ALT, AST, BILITOT in the last 168 hours.  Coagulation: No results for input(s): LABPROT, INR, APTT in the last 168 hours.  Diagnostic Results: None    Assessment/Plan:     Active Hospital Problems    Diagnosis  POA    *Arthritis s/p right knee revision arthroplasty [M19.90]  Continue to follow Orthopedic recommendations.  Needs aggressive incentive spirometry.  Follow hemoglobin and hematocrit closely.  Pain control with IV narcotics and antiemetics as needed.  Physical therapy as per Orthopedics protocol with fall precautions.    Yes    Anxiety [F41.9]  Chronic problem. Will continue chronic medications and monitor for any changes, adjusting as needed.    Yes    Depression  [F32.9]  Chronic problem. Will continue chronic medications and monitor for any changes, adjusting as needed.    Yes    Hyperlipemia [E78.5]  Chronic problem. Will continue chronic medications and monitor for any changes, adjusting as needed.    Yes    Obstructive sleep apnea on CPAP [G47.33, Z99.89]  Use CPAP as needed.     Not Applicable        DVT prophylaxis: On  mg PO BID as per Dr. Chase.    Elizabeth Washington MD  Department of Hospital Medicine   [facility]

## 2018-02-06 NOTE — TRANSFER OF CARE
"Anesthesia Transfer of Care Note    Patient: Jorgito Mora III    Procedure(s) Performed: Procedure(s) (LRB):  REVISION-ARTHROPLASTY-KNEE (Right)    Patient location: PACU    Anesthesia Type: general and regional    Transport from OR: Transported from OR on 2-3 L/min O2 by NC with adequate spontaneous ventilation    Post pain: adequate analgesia    Post assessment: no apparent anesthetic complications and tolerated procedure well    Post vital signs: stable    Level of consciousness: awake, alert and oriented    Nausea/Vomiting: no nausea/vomiting    Complications: none    Transfer of care protocol was followed      Last vitals:   Visit Vitals  /71 (BP Location: Right arm, Patient Position: Lying)   Pulse 92   Temp 37.3 °C (99.1 °F) (Skin)   Resp 16   Ht 5' 10.5" (1.791 m)   Wt 110.2 kg (243 lb)   SpO2 (!) 94%   BMI 34.37 kg/m²     "

## 2018-02-06 NOTE — ANESTHESIA PREPROCEDURE EVALUATION
02/06/2018  Jorgito Mora III is a 67 y.o., male.    Anesthesia Evaluation    I have reviewed the Patient Summary Reports.    I have reviewed the Nursing Notes.      Review of Systems  Anesthesia Hx:  No problems with previous Anesthesia    Cardiovascular:   Hypertension, well controlled        Physical Exam  General:  Well nourished                 Anesthesia Plan  Type of Anesthesia, risks & benefits discussed:  Anesthesia Type:  spinal  Patient's Preference:   Intra-op Monitoring Plan:   Intra-op Monitoring Plan Comments:   Post Op Pain Control Plan:   Post Op Pain Control Plan Comments:   Induction:   IV  Beta Blocker:  Patient is not currently on a Beta-Blocker (No further documentation required).       Informed Consent: Patient understands risks and agrees with Anesthesia plan.  Questions answered. Anesthesia consent signed with patient.  ASA Score: 2     Day of Surgery Review of History & Physical:    H&P update referred to the surgeon.         Ready For Surgery From Anesthesia Perspective.

## 2018-02-06 NOTE — NURSING
Pt was transferred from PACU via bed by nurse, pt is aaox3, pt has family at bedside, pt has ace wrap to the knee clean dry intact, AGGIE intact and output recorded, gutierrez catheter to gravity, 2L-O2 per NC, VSS and noted, pt tolerating clear liquids, ON Q intact, patient rates pain 2/10 nad noted will cont to monitor

## 2018-02-06 NOTE — BRIEF OP NOTE
[facility]  Brief Operative Note    SUMMARY     Surgery Date: 2/6/2018     Surgeon(s) and Role:     * Trell Chase Jr., MD - Primary    Assisting Surgeon: None    Pre-op Diagnosis:  Pain due to total right knee replacement [T84.84XA, Z96.651]    Post-op Diagnosis:  Post-Op Diagnosis Codes:     * Pain due to total right knee replacement [T84.84XA, Z96.651]      Anesthesia: General    Description of the findings of the procedure:open debridement and lysis adhesions  Estimated Blood Loss: 70 mL         Specimens:   Specimen (12h ago through future)    Start     Ordered    02/06/18 0926  Specimen to Pathology - Surgery  Once     Comments:  Pre-op Diagnosis: Pain due to total right knee replacement [T84.84XA, Z96.651]Post-op Diagnosis: SAME Procedure(s):REVISION-ARTHROPLASTY-KNEE Number of specimens: 3Name of specimens: RIGHT KNEE TISSUE      02/06/18 0926    02/06/18 0848  Specimen to Pathology - Surgery  Once     Comments:  Pre-op Diagnosis: Pain due to total right knee replacement [T84.84XA, Z96.651]Post-op Diagnosis: same  Procedure(s):REVISION-ARTHROPLASTY-KNEE Number of specimens: 1Name of specimens: right knee tissue FROZEN SECTION, HOW MANY POLY      02/06/18 0849

## 2018-02-07 VITALS
SYSTOLIC BLOOD PRESSURE: 138 MMHG | OXYGEN SATURATION: 98 % | WEIGHT: 243 LBS | HEIGHT: 70 IN | BODY MASS INDEX: 34.79 KG/M2 | DIASTOLIC BLOOD PRESSURE: 67 MMHG | TEMPERATURE: 98 F | RESPIRATION RATE: 17 BRPM | HEART RATE: 64 BPM

## 2018-02-07 LAB
ANION GAP SERPL CALC-SCNC: 7 MMOL/L
BASOPHILS # BLD AUTO: 0 K/UL
BASOPHILS NFR BLD: 0 %
BUN SERPL-MCNC: 16 MG/DL
CALCIUM SERPL-MCNC: 8.9 MG/DL
CHLORIDE SERPL-SCNC: 104 MMOL/L
CO2 SERPL-SCNC: 26 MMOL/L
CREAT SERPL-MCNC: 0.8 MG/DL
DIFFERENTIAL METHOD: ABNORMAL
EOSINOPHIL # BLD AUTO: 0 K/UL
EOSINOPHIL NFR BLD: 0 %
ERYTHROCYTE [DISTWIDTH] IN BLOOD BY AUTOMATED COUNT: 13.6 %
EST. GFR  (AFRICAN AMERICAN): >60 ML/MIN/1.73 M^2
EST. GFR  (NON AFRICAN AMERICAN): >60 ML/MIN/1.73 M^2
GLUCOSE SERPL-MCNC: 149 MG/DL
HCT VFR BLD AUTO: 35.2 %
HGB BLD-MCNC: 11.8 G/DL
LYMPHOCYTES # BLD AUTO: 0.6 K/UL
LYMPHOCYTES NFR BLD: 7.1 %
MCH RBC QN AUTO: 29 PG
MCHC RBC AUTO-ENTMCNC: 33.4 G/DL
MCV RBC AUTO: 87 FL
MONOCYTES # BLD AUTO: 0.5 K/UL
MONOCYTES NFR BLD: 5.3 %
NEUTROPHILS # BLD AUTO: 7.6 K/UL
NEUTROPHILS NFR BLD: 87.6 %
PLATELET # BLD AUTO: 173 K/UL
PMV BLD AUTO: 8.2 FL
POTASSIUM SERPL-SCNC: 4.5 MMOL/L
RBC # BLD AUTO: 4.05 M/UL
SODIUM SERPL-SCNC: 137 MMOL/L
WBC # BLD AUTO: 8.6 K/UL

## 2018-02-07 PROCEDURE — 25000003 PHARM REV CODE 250: Performed by: ANESTHESIOLOGY

## 2018-02-07 PROCEDURE — 94799 UNLISTED PULMONARY SVC/PX: CPT

## 2018-02-07 PROCEDURE — 80048 BASIC METABOLIC PNL TOTAL CA: CPT

## 2018-02-07 PROCEDURE — 97116 GAIT TRAINING THERAPY: CPT

## 2018-02-07 PROCEDURE — 36415 COLL VENOUS BLD VENIPUNCTURE: CPT

## 2018-02-07 PROCEDURE — 94761 N-INVAS EAR/PLS OXIMETRY MLT: CPT

## 2018-02-07 PROCEDURE — 99239 HOSP IP/OBS DSCHRG MGMT >30: CPT | Mod: ,,, | Performed by: INTERNAL MEDICINE

## 2018-02-07 PROCEDURE — 85025 COMPLETE CBC W/AUTO DIFF WBC: CPT

## 2018-02-07 PROCEDURE — 25000003 PHARM REV CODE 250: Performed by: ORTHOPAEDIC SURGERY

## 2018-02-07 RX ORDER — ASPIRIN 325 MG
325 TABLET ORAL 2 TIMES DAILY
Refills: 0 | Status: ON HOLD
Start: 2018-02-07 | End: 2018-05-08

## 2018-02-07 RX ADMIN — ACETAMINOPHEN 650 MG: 325 TABLET, FILM COATED ORAL at 05:02

## 2018-02-07 RX ADMIN — DULOXETINE HYDROCHLORIDE 20 MG: 20 CAPSULE, DELAYED RELEASE ORAL at 08:02

## 2018-02-07 RX ADMIN — METHOCARBAMOL 500 MG: 500 TABLET ORAL at 05:02

## 2018-02-07 RX ADMIN — ASPIRIN 325 MG ORAL TABLET 325 MG: 325 PILL ORAL at 08:02

## 2018-02-07 RX ADMIN — PREGABALIN 75 MG: 75 CAPSULE ORAL at 08:02

## 2018-02-07 RX ADMIN — GABAPENTIN 400 MG: 400 CAPSULE ORAL at 05:02

## 2018-02-07 RX ADMIN — FAMOTIDINE 20 MG: 20 TABLET, FILM COATED ORAL at 08:02

## 2018-02-07 RX ADMIN — ACETAMINOPHEN 650 MG: 325 TABLET, FILM COATED ORAL at 11:02

## 2018-02-07 RX ADMIN — OXYCODONE HYDROCHLORIDE 5 MG: 10 TABLET ORAL at 08:02

## 2018-02-07 RX ADMIN — ACETAMINOPHEN 650 MG: 325 TABLET, FILM COATED ORAL at 01:02

## 2018-02-07 RX ADMIN — MUPIROCIN 1 G: 20 OINTMENT TOPICAL at 08:02

## 2018-02-07 RX ADMIN — OXYBUTYNIN CHLORIDE 5 MG: 5 TABLET ORAL at 05:02

## 2018-02-07 RX ADMIN — PANTOPRAZOLE SODIUM 40 MG: 40 TABLET, DELAYED RELEASE ORAL at 08:02

## 2018-02-07 RX ADMIN — CELECOXIB 200 MG: 100 CAPSULE ORAL at 08:02

## 2018-02-07 NOTE — NURSING
Pt was discharge to home per Md orders, pt received discharge instructions, prescriptions and education handout, Pt IV was removed pt tolerated well no hematoma or swelling noted, pt has all personal belongings, wife at bedside, VSS and noted, pt is aware of his follow up appt with Dr Chase within 2 weeks pt verb understanding nad noted will cont to monitor

## 2018-02-07 NOTE — PLAN OF CARE
02/07/18 1513   Final Note   Assessment Type Final Discharge Note   Discharge Disposition Home  (Patient to go to outpatient PT at [facility] PT)

## 2018-02-07 NOTE — PLAN OF CARE
Problem: Patient Care Overview  Goal: Plan of Care Review  Outcome: Ongoing (interventions implemented as appropriate)  Pt pain is controlled with the OnQ ball at 8cc/hr, pt had gutierrez catheter to gravity with adequate UOP, tolerating diet VSS and noted, wife at bedside, absent from injury, IV abx scheduled nad noted

## 2018-02-07 NOTE — PLAN OF CARE
02/06/18 2049   PRE-TX-O2-ETCO2   O2 Device (Oxygen Therapy) room air   SpO2 95 %   Pulse Oximetry Type Intermittent   Incentive Spirometer   $ Incentive Spirometer Charges done with encouragement   Administration (Incentive Spirometer) done with encouragement   Number of Repetitions (Incentive Spirometer) 10   Level (mL) (Incentive Spirometer) 2250   Patient Tolerance good

## 2018-02-07 NOTE — PT/OT/SLP PROGRESS
"Physical Therapy Treatment    Patient Name:  Jorgito Mora III   MRN:  [id number]    Recommendations:     Discharge Recommendations:  outpatient PT   Discharge Equipment Recommendations: none   Barriers to discharge: None    Assessment:     Jorgito Mora III is a 67 y.o. male admitted with a medical diagnosis of Arthritis.  He presents with the following impairments/functional limitations:  weakness, impaired endurance, impaired balance, decreased lower extremity function, decreased ROM, impaired joint extensibility, orthopedic precautions, gait instability. Pt with improved transfer independence this afternoon. He does fatigue after walking 250 feet.    Rehab Prognosis:  good; patient would benefit from acute skilled PT services to address these deficits and reach maximum level of function.      Recent Surgery: Procedure(s) (LRB):  EXPLORATION-WOUND (Right)  DEBRIDEMENT-KNEE (Right)  LYSIS-ADHESION (Right) 1 Day Post-Op    Plan:     During this hospitalization, patient to be seen BID (QD Sat - Sun) to address the above listed problems via gait training, therapeutic activities, therapeutic exercises  · Plan of Care Expires:  03/07/18   Plan of Care Reviewed with: patient, spouse    Subjective     Communicated with RN prior to session.  Patient found seated EOB upon PT entry to room, agreeable to treatment.      Chief Complaint: None stated  Patient comments/goals: "I walked the unit not long ago."  Pain/Comfort:  · Pain Rating 1: 0/10  · Location - Side 1: Right  · Location - Orientation 1: generalized  · Location 1: knee  · Pain Addressed 1: Reposition, Distraction  · Pain Rating Post-Intervention 1: 2/10    Patients cultural, spiritual, Baptism conflicts given the current situation: None    Objective:     Patient found with: perineural catheter     General Precautions: Standard, fall   Orthopedic Precautions:RLE weight bearing as tolerated   Braces: N/A     Functional Mobility:  · Transfers  · Sit to " Stand:  stand by assistance with rolling walker from EOB  · From bedside chair, pt required mod assist to stand. PT reviewed techniques such as building up seat, scooting to edge and pushing from armrests  · Gait:  · Pt ambulated 250 feet with stand by assistance using a rolling walker. Cues for upright posture and increased R step height and length.     AM-PAC 6 CLICK MOBILITY  Turning over in bed (including adjusting bedclothes, sheets and blankets)?: 4  Sitting down on and standing up from a chair with arms (e.g., wheelchair, bedside commode, etc.): 3  Moving from lying on back to sitting on the side of the bed?: 4  Moving to and from a bed to a chair (including a wheelchair)?: 3  Need to walk in hospital room?: 3  Climbing 3-5 steps with a railing?: 3  Total Score: 20     Patient left seated EOB with all lines intact and call button in reach..    GOALS:    Physical Therapy Goals        Problem: Physical Therapy Goal    Goal Priority Disciplines Outcome Goal Variances Interventions   Physical Therapy Goal     PT/OT, PT Ongoing (interventions implemented as appropriate)     Description:  Goals to be met by: 2018     Patient will increase functional independence with mobility by performin. Supine to sit with Stand-by Assistance  2. Sit to supine with Stand-by Assistance  3. Sit to stand transfer with Stand-by Assistance  4. Bed to chair transfer with Stand-by Assistance using Rolling Walker  5. Gait  x 250 feet with Stand-by Assistance using Rolling Walker.   6. Lower extremity exercise program x10 reps per handout, with supervision                      Time Tracking:     PT Received On: 18  PT Start Time: 1315     PT Stop Time: 1326  PT Total Time (min): 11 min     Billable Minutes: Gait Training 11    Treatment Type: Treatment  PT/PTA: PT           Jessica LeJeune, PT, DPT

## 2018-02-07 NOTE — PLAN OF CARE
Problem: Physical Therapy Goal  Goal: Physical Therapy Goal  Goals to be met by: 2018     Patient will increase functional independence with mobility by performin. Supine to sit with Stand-by Assistance  2. Sit to supine with Stand-by Assistance  3. Sit to stand transfer with Stand-by Assistance  4. Bed to chair transfer with Stand-by Assistance using Rolling Walker  5. Gait  x 250 feet with Stand-by Assistance using Rolling Walker.   6. Lower extremity exercise program x10 reps per handout, with supervision     Outcome: Ongoing (interventions implemented as appropriate)  Pt is progressing toward goals.

## 2018-02-07 NOTE — PT/OT/SLP PROGRESS
"Physical Therapy Treatment    Patient Name:  Jorgito Mora III   MRN:  [id number]    Recommendations:     Discharge Recommendations:  outpatient PT   Discharge Equipment Recommendations: none   Barriers to discharge: None    Assessment:     Jorgito Mora III is a 67 y.o. male admitted with a medical diagnosis of Arthritis s/p adhesion lysis R knee.  He presents with the following impairments/functional limitations:  impaired endurance, impaired functional mobilty, decreased ROM, decreased lower extremity function, gait instability, pain, orthopedic precautions, impaired joint extensibility. Pt with good progress with gait today. He is safe to return home with OP PT.    Rehab Prognosis:  good; patient would benefit from acute skilled PT services to address these deficits and reach maximum level of function.      Recent Surgery: Procedure(s) (LRB):  REVISION-ARTHROPLASTY-KNEE (Right) 1 Day Post-Op    Plan:     During this hospitalization, patient to be seen BID (QD Sat-Sun) to address the above listed problems via gait training, therapeutic activities, therapeutic exercises  · Plan of Care Expires:  03/07/18   Plan of Care Reviewed with: patient, spouse    Subjective     Communicated with RN prior to session.  Patient found seated EOB upon PT entry to room, agreeable to treatment.      Chief Complaint: R knee pain  Patient comments/goals: "I hope I can go home today. My knee is feeling better."  Pain/Comfort:  · Pain Rating 1: 2/10  · Location - Side 1: Right  · Location - Orientation 1: generalized  · Location 1: knee  · Pain Addressed 1: Reposition, Distraction  · Pain Rating Post-Intervention 1: 2/10    Patients cultural, spiritual, Restorationist conflicts given the current situation: None    Objective:     Patient found with: perineural catheter     General Precautions: Standard, fall   Orthopedic Precautions:RLE weight bearing as tolerated   Braces: N/A     Functional Mobility:  · Transfers  · Sit to " Stand:  supervision with rolling walker  · Gait:  · Pt ambulated 250 feet with stand by assistance using a rolling walker. Cues for upright posture and to maintain BHARATI within walker base. Pt with no knee flexion ins wing, so cues given to lift LE higher and achieve knee flexion.         AM-PAC 6 CLICK MOBILITY  Turning over in bed (including adjusting bedclothes, sheets and blankets)?: 4  Sitting down on and standing up from a chair with arms (e.g., wheelchair, bedside commode, etc.): 3  Moving from lying on back to sitting on the side of the bed?: 4  Moving to and from a bed to a chair (including a wheelchair)?: 3  Need to walk in hospital room?: 3  Climbing 3-5 steps with a railing?: 3  Total Score: 20       Therapeutic Activities and Exercises:   Pt instructed in seated LAQ and AAROM into knee flexion using LLE. Pt asked about getting onto his home stationary bike and was given instructions to mount properly and avoid excessive pressures on R knee.     Patient left up in chair with all lines intact and call button in reach..    GOALS:    Physical Therapy Goals        Problem: Physical Therapy Goal    Goal Priority Disciplines Outcome Goal Variances Interventions   Physical Therapy Goal     PT/OT, PT Ongoing (interventions implemented as appropriate)     Description:  Goals to be met by: 2018     Patient will increase functional independence with mobility by performin. Supine to sit with Stand-by Assistance  2. Sit to supine with Stand-by Assistance  3. Sit to stand transfer with Stand-by Assistance  4. Bed to chair transfer with Stand-by Assistance using Rolling Walker  5. Gait  x 250 feet with Stand-by Assistance using Rolling Walker.   6. Lower extremity exercise program x10 reps per handout, with supervision                      Time Tracking:     PT Received On: 18  PT Start Time: 935     PT Stop Time: 947  PT Total Time (min): 12 min     Billable Minutes: Gait Training 12    Treatment  Type: Treatment  PT/PTA: PT           Jessica LeJeune, PT, SOPHIA

## 2018-02-07 NOTE — PROGRESS NOTES
[facility]  Orthopedics  Progress Note    Patient Name: Jorgito Mora III  MRN: [id number]  Admission Date: 2/6/2018  Hospital Length of Stay: 1 days  Attending Provider: Elizabeth Washington MD  Primary Care Provider: Julio Felipe MD  Follow-up For: Procedure(s) (LRB):  REVISION-ARTHROPLASTY-KNEE (Right)    Post-Operative Day: 1 Day Post-Op  Subjective:pt can walk,  N/v ok,  Drain out     No new subjective & objective note has been filed under this hospital service since the last note was generated.    Assessment/Plan:ready to d/c home,   cpm at home,   rx napr,  norco 10     No notes have been filed under this hospital service.  Service: Orthopedic Surgery        Trell Chase Jr, MD  Orthopedics  [facility]

## 2018-02-07 NOTE — DISCHARGE SUMMARY
Discharge Summary  Hospital Medicine    Admit Date: 2/6/2018    Date and Time: 2/7/201810:13 AM    Discharge Attending Physician: Elizabeth Washington MD    Primary Care Physician: Julio Felipe MD    Diagnoses:  Active Hospital Problems    Diagnosis  POA    *Arthritis s/p right knee revision arthroplasty [M19.90]  Yes    Anxiety [F41.9]  Yes    Depression [F32.9]  Yes    Hyperlipidemia [E78.5]  Yes    Obstructive sleep apnea on CPAP [G47.33, Z99.89]  Not Applicable      Resolved Hospital Problems    Diagnosis Date Resolved POA   No resolved problems to display.     Discharged Condition: Good    Hospital Course:   Patient is a 67 y.o. male admitted to Hospitalist Service from Operation [facility] s/p right knee revision arthroplasty performed by Dr. Chase. Patient reportedly has past medical history significant for OA, hyperlipidemia, depression, VASILE (on CPAP) and PTSD. Post-operatively, patient denied chest pain, shortness of breath, abdominal pain, nausea, vomiting, headache, vision changes, focal neuro-deficits, cough or fever. Patient was admitted to Hospitalist medicine service. Patient was followed by Dr. Chase. Post-operative, patient did well. Pain adequately controlled. Patient participated with physical therapy. Home health and home physical therapy has been arranged. Fall precautions discussed with the patient. Patient to follow up with primary care physician next week and orthopedic doctor in 2 weeks. Post-operative anti-coagulation as per orthopedics recommendations advised. In case of chest pain, shortness of breath, stroke or stroke like symptoms, high grade fever or any signs or symptoms of surgical site wound infection symptoms, patient to return to nearest emergency room as soon as possible. Patient was discharged home in stable condition with following discharge plan of care. Total time with the patient was 30 minutes and greater than 50% was spent in counseling and coordination of care. The assessment  and plan have been discussed at length. Physicians' notes reviewed. Labs and procedure reviewed.     Consults: Dr. Chase    Significant Diagnostic Studies:     Microbiology Results (last 7 days)     Procedure Component Value Units Date/Time    Culture, Anaerobic [[id number]] Collected:  02/06/18 0858    Order Status:  Sent Specimen:  Joint Fluid from Knee, Right Updated:  02/06/18 2007    Culture, Anaerobic [[id number]] Collected:  02/06/18 0907    Order Status:  Sent Specimen:  Tissue from Knee, Right Updated:  02/06/18 2007    Aerobic culture [[id number]] Collected:  02/06/18 0858    Order Status:  Sent Specimen:  Joint Fluid from Knee, Right Updated:  02/06/18 2007    Aerobic culture [[id number]] Collected:  02/06/18 0907    Order Status:  Sent Specimen:  Tissue from Knee, Right Updated:  02/06/18 2007    Aerobic culture [[id number]] Collected:  02/06/18 0858    Order Status:  Canceled Specimen:  Joint Fluid from Knee, Right Updated:  02/06/18 0859    Culture, Anaerobic [[id number]] Collected:  02/06/18 0858    Order Status:  Canceled Specimen:  Joint Fluid from Knee, Right Updated:  02/06/18 0859        Special Treatments/Procedures: as above  Disposition: Home or Self Care    Medications:  Reconciled Home Medications: Current Discharge Medication List      START taking these medications    Details   aspirin 325 MG tablet Take 1 tablet (325 mg total) by mouth 2 (two) times daily.  Refills: 0         CONTINUE these medications which have NOT CHANGED    Details   atorvastatin (LIPITOR) 80 MG tablet Take 40 mg by mouth every evening.       busPIRone (BUSPAR) 10 MG tablet Take 20 mg by mouth 3 (three) times daily.       DULoxetine (CYMBALTA) 20 MG capsule Take 20 mg by mouth 2 (two) times daily.      gabapentin (NEURONTIN) 400 MG capsule Take 400 mg by mouth 3 (three) times daily.      ketoconazole (NIZORAL) 2 % shampoo Apply 1 application topically twice a week.       methocarbamol (ROBAXIN) 750 MG Tab Take 500 mg by  mouth 3 (three) times daily.      oxybutynin (DITROPAN) 5 MG Tab Take 5 mg by mouth 3 (three) times daily.      pantoprazole (PROTONIX) 40 MG tablet Take 40 mg by mouth once daily.      urea (CARMOL) 10 % cream Apply 1 application topically as needed.       VITAMIN B COMPLEX ORAL Take 1 tablet by mouth Daily.       BED UNDERPADS MISC by Misc.(Non-Drug; Combo Route) route.      clotrimazole (LOTRIMIN) 1 % Soln Apply 1 application topically 2 (two) times daily.       DIAPER,BRIEF,ADULT, DISPOSABLE (DEPEND UNDERWEAR FOR MEN L-XL MISC) by Misc.(Non-Drug; Combo Route) route.      INCONTINENCE PAD,LINER,DISP (POISE PADS EXTRA PLUS MISC) by Misc.(Non-Drug; Combo Route) route.      naproxen (NAPROSYN) 500 MG tablet Take 500 mg by mouth 2 (two) times daily with meals.      oxycodone-acetaminophen (PERCOCET)  mg per tablet Take 1 tablet by mouth as needed for Pain.         STOP taking these medications       ammonium lactate 12 % Crea Comments:   Reason for Stopping:         FLUZONE HIGH-DOSE 2017-18, PF, 180 mcg/0.5 mL vaccine Comments:   Reason for Stopping:         PREVNAR 13, PF, 0.5 mL Syrg Comments:   Reason for Stopping:               Discharge Procedure Orders  Diet Cardiac     Activity as tolerated   Order Comments: Observe fall precautions.     Call MD for:   Order Comments: For worsening symptoms, chest pain, shortness of breath, increased abdominal pain, high grade fever, stroke or stroke like symptoms, immediately go to the nearest Emergency Room or call 911 as soon as possible.       Follow-up Information     Julio Felipe MD In 1 week.    Specialty:  Family Medicine  Contact information:  [address]  [place] [id number] [phone]             Trell Chase Jr, MD In 2 weeks.    Specialty:  Orthopedic Surgery  Contact information:  [address]  [address]  [place] [address]  [phone]

## 2018-02-07 NOTE — PLAN OF CARE
Problem: Patient Care Overview  Goal: Plan of Care Review  Outcome: Ongoing (interventions implemented as appropriate)  Pt AAO x4, spouse at bedside.  PIV in place, IVF infusing throughout shift.  Guardado in place, clear yellow urine draining, to be d/c by end of shift.  AGGIE drain in place, emptied and documented throughout shift.  Bilat foot pumps in place.  Ice pack to site throughout shift.  PRN pain medication administered, adequate relief achieved.  I/O monitored and documented.  VS stable.  Hourly rounding completed.  Pt has remained free of injuries and falls throughout shift.  Environment free of clutter, side rails up x2, and call light within reach.  Pt has verbalized understanding of plan of care.  Will continue to monitor.

## 2018-02-07 NOTE — PLAN OF CARE
CM met with patient and Nicolasa jesus at bedside, patient reports that he has rolling walker , BSC and CPM was delivered to his home yesterday. Patient shana reports that he will not need HH, he plans to use the CPM for next 2 weeks and then  go to outpatient therapy @ [facility] per Dr. Chase's instructions to him. Patient denies POA, living will or HH. CM made followup post op appointment and  Added to AVS.      02/07/18 1112   Discharge Assessment   Assessment Type Discharge Planning Assessment   Confirmed/corrected address and phone number on facesheet? Yes   Assessment information obtained from? Patient;Caregiver   Communicated expected length of stay with patient/caregiver yes   Prior to hospitilization cognitive status: Alert/Oriented   Prior to hospitalization functional status: Independent   Current cognitive status: Alert/Oriented   Current Functional Status: Assistive Equipment   Lives With spouse   Able to Return to Prior Arrangements yes   Is patient able to care for self after discharge? Yes   Patient's perception of discharge disposition home or selfcare   Readmission Within The Last 30 Days no previous admission in last 30 days   Patient currently being followed by outpatient case management? No   Patient currently receives any other outside agency services? No   Equipment Currently Used at Home walker, rolling;3-in-1 commode;shower chair  (CPM)   Do you have any problems affording any of your prescribed medications? No   Is the patient taking medications as prescribed? yes   Does the patient have transportation home? Yes   Transportation Available family or friend will provide   Does the patient receive services at the [facility]? No   Discharge Plan A Home   Patient/Family In Agreement With Plan yes

## 2018-02-07 NOTE — PLAN OF CARE
Problem: Physical Therapy Goal  Goal: Physical Therapy Goal  Goals to be met by: 2018     Patient will increase functional independence with mobility by performin. Supine to sit with Stand-by Assistance  2. Sit to supine with Stand-by Assistance  3. Sit to stand transfer with Stand-by Assistance  4. Bed to chair transfer with Stand-by Assistance using Rolling Walker  5. Gait  x 250 feet with Stand-by Assistance using Rolling Walker.   6. Lower extremity exercise program x10 reps per handout, with supervision     Outcome: Ongoing (interventions implemented as appropriate)  Pt is progressing well toward goals.

## 2018-02-08 NOTE — ANESTHESIA POST-OP PAIN MANAGEMENT
Acute Pain Service Progress Note    Jorgito Mora III is a 67 y.o., male, [id number].    Surgery:  Knee    Post Op Day #: 2    Catheter type: perineural  femoral    Infusion type: Ropivacaine 0.2%  8 basal    Problem List:    Active Hospital Problems    Diagnosis  POA    *Arthritis s/p right knee revision arthroplasty [M19.90]  Yes    Anxiety [F41.9]  Yes    Depression [F32.9]  Yes    Hyperlipidemia [E78.5]  Yes    Obstructive sleep apnea on CPAP [G47.33, Z99.89]  Not Applicable      Resolved Hospital Problems    Diagnosis Date Resolved POA   No resolved problems to display.       Subjective:     General appearance of alert, oriented, no complaints   Pain with rest: 2    Numbers   Pain with movement: 2    Numbers   Side Effects    1. Pruritis No    2. Nausea No    3. Motor Blockade No, 1=Ability to bend knees and ankles    4. Sedation No, 1=awake and alert    Objective:     Catheter level    Catheter site removed with tip intact   Catheter placement       Vitals   Vitals:    02/07/18 1219   BP: 138/67   Pulse: 64   Resp: 17   Temp: 36.4 °C (97.5 °F)        Labs    Admission on 02/06/2018, Discharged on 02/07/2018   Component Date Value Ref Range Status    Aerobic Bacterial Culture 02/06/2018 No growth   Preliminary    Aerobic Bacterial Culture 02/06/2018 No growth   Preliminary    Sodium 02/07/2018 137  136 - 145 mmol/L Final    Potassium 02/07/2018 4.5  3.5 - 5.1 mmol/L Final    Chloride 02/07/2018 104  95 - 110 mmol/L Final    CO2 02/07/2018 26  23 - 29 mmol/L Final    Glucose 02/07/2018 149* 70 - 110 mg/dL Final    BUN, Bld 02/07/2018 16  8 - 23 mg/dL Final    Creatinine 02/07/2018 0.8  0.5 - 1.4 mg/dL Final    Calcium 02/07/2018 8.9  8.7 - 10.5 mg/dL Final    Anion Gap 02/07/2018 7* 8 - 16 mmol/L Final    eGFR if African American 02/07/2018 >60  >60 mL/min/1.73 m^2 Final    eGFR if non African American 02/07/2018 >60  >60 mL/min/1.73 m^2 Final    WBC 02/07/2018 8.60  3.90 - 12.70 K/uL Final     RBC 02/07/2018 4.05* 4.60 - 6.20 M/uL Final    Hemoglobin 02/07/2018 11.8* 14.0 - 18.0 g/dL Final    Hematocrit 02/07/2018 35.2* 40.0 - 54.0 % Final    MCV 02/07/2018 87  82 - 98 fL Final    MCH 02/07/2018 29.0  27.0 - 31.0 pg Final    MCHC 02/07/2018 33.4  32.0 - 36.0 g/dL Final    RDW 02/07/2018 13.6  11.5 - 14.5 % Final    Platelets 02/07/2018 173  150 - 350 K/uL Final    MPV 02/07/2018 8.2* 9.2 - 12.9 fL Final    Gran # (ANC) 02/07/2018 7.6  1.8 - 7.7 K/uL Final    Lymph # 02/07/2018 0.6* 1.0 - 4.8 K/uL Final    Mono # 02/07/2018 0.5  0.3 - 1.0 K/uL Final    Eos # 02/07/2018 0.0  0.0 - 0.5 K/uL Final    Baso # 02/07/2018 0.00  0.00 - 0.20 K/uL Final    Gran% 02/07/2018 87.6* 38.0 - 73.0 % Final    Lymph% 02/07/2018 7.1* 18.0 - 48.0 % Final    Mono% 02/07/2018 5.3  4.0 - 15.0 % Final    Eosinophil% 02/07/2018 0.0  0.0 - 8.0 % Final    Basophil% 02/07/2018 0.0  0.0 - 1.9 % Final    Differential Method 02/07/2018 Automated   Final        Meds   No current facility-administered medications for this encounter.      Current Outpatient Prescriptions   Medication Sig Dispense Refill    atorvastatin (LIPITOR) 80 MG tablet Take 40 mg by mouth every evening.       busPIRone (BUSPAR) 10 MG tablet Take 20 mg by mouth 3 (three) times daily.       DULoxetine (CYMBALTA) 20 MG capsule Take 20 mg by mouth 2 (two) times daily.      gabapentin (NEURONTIN) 400 MG capsule Take 400 mg by mouth 3 (three) times daily.      ketoconazole (NIZORAL) 2 % shampoo Apply 1 application topically twice a week.       methocarbamol (ROBAXIN) 750 MG Tab Take 500 mg by mouth 3 (three) times daily.      oxybutynin (DITROPAN) 5 MG Tab Take 5 mg by mouth 3 (three) times daily.      pantoprazole (PROTONIX) 40 MG tablet Take 40 mg by mouth once daily.      urea (CARMOL) 10 % cream Apply 1 application topically as needed.       VITAMIN B COMPLEX ORAL Take 1 tablet by mouth Daily.       aspirin 325 MG tablet Take 1 tablet  (325 mg total) by mouth 2 (two) times daily.  0    BED UNDERPADS MISC by Misc.(Non-Drug; Combo Route) route.      clotrimazole (LOTRIMIN) 1 % Soln Apply 1 application topically 2 (two) times daily.       DIAPER,BRIEF,ADULT, DISPOSABLE (DEPEND UNDERWEAR FOR MEN L-XL MISC) by Misc.(Non-Drug; Combo Route) route.      INCONTINENCE PAD,LINER,DISP (POISE PADS EXTRA PLUS MISC) by Misc.(Non-Drug; Combo Route) route.      naproxen (NAPROSYN) 500 MG tablet Take 500 mg by mouth 2 (two) times daily with meals.      oxycodone-acetaminophen (PERCOCET)  mg per tablet Take 1 tablet by mouth as needed for Pain.          Anticoagulant dose    Assessment:     Pain control adequate    Plan:     Patient doing well, continue present treatment.    Evaluator Coleman Massey

## 2018-02-08 NOTE — ADDENDUM NOTE
Addendum  created 02/08/18 1410 by Coleman Massey MD    Anesthesia Event edited, Sign clinical note

## 2018-02-10 LAB — BACTERIA SPEC AEROBE CULT: NO GROWTH

## 2018-02-12 LAB — BACTERIA SPEC AEROBE CULT: NO GROWTH

## 2018-02-14 LAB
BACTERIA SPEC ANAEROBE CULT: NORMAL
BACTERIA SPEC ANAEROBE CULT: NORMAL

## 2018-04-17 DIAGNOSIS — M25.511 ACUTE PAIN OF RIGHT SHOULDER: ICD-10-CM

## 2018-04-17 DIAGNOSIS — S46.011A STRAIN OF TENDON OF RIGHT ROTATOR CUFF, INITIAL ENCOUNTER: Primary | ICD-10-CM

## 2018-04-27 ENCOUNTER — OFFICE VISIT (OUTPATIENT)
Dept: ORTHOPEDICS | Facility: CLINIC | Age: 68
End: 2018-04-27
Payer: MEDICARE

## 2018-04-27 ENCOUNTER — TELEPHONE (OUTPATIENT)
Dept: ORTHOPEDICS | Facility: CLINIC | Age: 68
End: 2018-04-27

## 2018-04-27 VITALS
WEIGHT: 240 LBS | HEIGHT: 70 IN | SYSTOLIC BLOOD PRESSURE: 148 MMHG | DIASTOLIC BLOOD PRESSURE: 79 MMHG | HEART RATE: 78 BPM | BODY MASS INDEX: 34.36 KG/M2

## 2018-04-27 DIAGNOSIS — S46.011A TRAUMATIC TEAR OF RIGHT ROTATOR CUFF, UNSPECIFIED TEAR EXTENT, INITIAL ENCOUNTER: Primary | ICD-10-CM

## 2018-04-27 DIAGNOSIS — S46.011A TRAUMATIC COMPLETE TEAR OF RIGHT ROTATOR CUFF, INITIAL ENCOUNTER: Primary | ICD-10-CM

## 2018-04-27 PROCEDURE — 99214 OFFICE O/P EST MOD 30 MIN: CPT | Mod: ,,, | Performed by: ORTHOPAEDIC SURGERY

## 2018-04-27 RX ORDER — OXYCODONE AND ACETAMINOPHEN 10; 325 MG/1; MG/1
1 TABLET ORAL EVERY 6 HOURS PRN
Qty: 30 TABLET | Refills: 0 | Status: SHIPPED | OUTPATIENT
Start: 2018-04-27 | End: 2018-05-21 | Stop reason: SDUPTHER

## 2018-04-27 NOTE — PROGRESS NOTES
[facility]    Subjective:     Chief Complaint:   Chief Complaint   Patient presents with    Shoulder Pain     Follow up with MRI of right shoulder (04/24/18)    Knee Pain     wants to go ahead and follow up with knee if possible today.        Past Medical History:   Diagnosis Date    Anxiety     Arthritis     Cancer 2005    Prostate    Depression     Encounter for blood transfusion     Hyperlipemia     Neuropathy     Obstructive sleep apnea on CPAP     PTSD (post-traumatic stress disorder)        Past Surgical History:   Procedure Laterality Date    BACK SURGERY      burnt nerves      ELBOW SURGERY  2010, 2008    left, right    GALLBLADDER SURGERY      JOINT REPLACEMENT  2007, 2012    Bilateral knees    KNEE SURGERY  2001, 2007    left and right knee    Neck surgery with plate      Crushed C2-3-4    prostate cancer      SHOULDER SURGERY  2004    right shoulder       Current Outpatient Prescriptions   Medication Sig    atorvastatin (LIPITOR) 80 MG tablet Take 40 mg by mouth every evening.     BED UNDERPADS MISC by Misc.(Non-Drug; Combo Route) route.    busPIRone (BUSPAR) 10 MG tablet Take 20 mg by mouth 3 (three) times daily.     clotrimazole (LOTRIMIN) 1 % Soln Apply 1 application topically 2 (two) times daily.     DIAPER,BRIEF,ADULT, DISPOSABLE (DEPEND UNDERWEAR FOR MEN L-XL MISC) by Misc.(Non-Drug; Combo Route) route.    DULoxetine (CYMBALTA) 20 MG capsule Take 20 mg by mouth 2 (two) times daily.    gabapentin (NEURONTIN) 400 MG capsule Take 400 mg by mouth 3 (three) times daily.    INCONTINENCE PAD,LINER,DISP (POISE PADS EXTRA PLUS MISC) by Misc.(Non-Drug; Combo Route) route.    ketoconazole (NIZORAL) 2 % shampoo Apply 1 application topically twice a week.     methocarbamol (ROBAXIN) 750 MG Tab Take 500 mg by mouth 3 (three) times daily.    naproxen (NAPROSYN) 500 MG tablet Take 500 mg by mouth 2 (two) times daily with meals.    oxybutynin (DITROPAN) 5 MG Tab Take 5 mg  by mouth 3 (three) times daily.    oxycodone-acetaminophen (PERCOCET)  mg per tablet Take 1 tablet by mouth as needed for Pain.    pantoprazole (PROTONIX) 40 MG tablet Take 40 mg by mouth once daily.    urea (CARMOL) 10 % cream Apply 1 application topically as needed.     VITAMIN B COMPLEX ORAL Take 1 tablet by mouth Daily.     aspirin 325 MG tablet Take 1 tablet (325 mg total) by mouth 2 (two) times daily.     No current facility-administered medications for this visit.        Review of patient's allergies indicates:  No Known Allergies    No family history on file.    Social History     Social History    Marital status:      Spouse name: N/A    Number of children: N/A    Years of education: N/A     Occupational History    Not on file.     Social History Main Topics    Smoking status: Former Smoker     Packs/day: 0.50     Years: 10.00     Types: Cigarettes    Smokeless tobacco: Never Used    Alcohol use Yes      Comment: rare socially     Drug use: No    Sexual activity: Not on file     Other Topics Concern    Not on file     Social History Narrative    No narrative on file       History of present illness: Right shoulder MRI shows evidence for an old rotator cuff repair plus he has a large retracted rotator cuff tear is retracted medial to the acromion there is evidence for prior subacromial arch decompression. There does not appear to be significant fatty degeneration of the supraspinatus muscle I do not see arthritic changes in the glenohumeral joint. We know he has a prior rotator cuff tear and the recent injury with increased pain in the right shoulder. He feels like prior to this recent fall his arm was in pretty good condition had reasonable power and no pain.      Review of Systems:    Constitution: Negative for chills, fever, and sweats.  Negative for unexplained weight loss.    HENT:  Negative for headaches and blurry vision.    Cardiovascular:Negative for chest pain or  irregular heart beat. Negative for hypertension.    Respiratory:  Negative for cough and shortness of breath.    Gastrointestinal: Negative for abdominal pain, heartburn, melena, nausea, and vomitting.    Genitourinary:  Negative bladder incontinence and dysuria.    Musculoskeletal:  See HPI for details.     Neurological: Negative for numbness.    Psychiatric/Behavioral: Negative for depression.  The patient is not nervous/anxious.      Endocrine: Negative for polyuria    Hematologic/Lymphatic: Negative for bleeding problem.  Does not bruise/bleed easily.    Skin: Negative for poor would healing and rash    Objective:      Physical Examination:    Vital Signs:    Vitals:    04/27/18 1030   BP: (!) 148/79   Pulse: 78       Body mass index is 34.44 kg/m².    This a well-developed, well nourished patient in no acute distress.  They are alert and oriented and cooperative to examination.        Physical exam shows he does have a painful arc in the right shoulder. He does have pretty impressive at abduction power.    XRAY Report / Interpretation:       Assessment/Plan:      Impression is rotator cuff tear right shoulder some of it could be new some of it could be old. Based on history an MRI probably not all of this is old and therefore I do think that surgery to repair whatever is recent is a good idea. So we will schedule an arthroscopic rotator cuff repair right shoulder in the near future. He does take some pain medicine and we renewed that. Consent for right rotator cuff surgery done today. Of course if there is a significant irreparable portion of the rotator cuff and he would not have a normal shoulder afterwards      This note was created using Dragon voice recognition software that occasionally misinterpreted phrases or words.

## 2018-04-30 ENCOUNTER — HOSPITAL ENCOUNTER (OUTPATIENT)
Dept: PREADMISSION TESTING | Facility: HOSPITAL | Age: 68
Discharge: HOME | End: 2018-04-30
Attending: ORTHOPAEDIC SURGERY
Payer: MEDICARE

## 2018-04-30 VITALS — WEIGHT: 240 LBS | BODY MASS INDEX: 34.36 KG/M2 | HEIGHT: 70 IN

## 2018-04-30 DIAGNOSIS — S46.011A STRAIN OF TENDON OF RIGHT ROTATOR CUFF, INITIAL ENCOUNTER: Primary | ICD-10-CM

## 2018-04-30 LAB
ANION GAP SERPL CALC-SCNC: 8 MMOL/L
BASOPHILS # BLD AUTO: 0 K/UL
BASOPHILS NFR BLD: 0.4 %
BUN SERPL-MCNC: 19 MG/DL
CALCIUM SERPL-MCNC: 9.7 MG/DL
CHLORIDE SERPL-SCNC: 106 MMOL/L
CO2 SERPL-SCNC: 26 MMOL/L
CREAT SERPL-MCNC: 0.8 MG/DL
DIFFERENTIAL METHOD: ABNORMAL
EOSINOPHIL # BLD AUTO: 0.1 K/UL
EOSINOPHIL NFR BLD: 2 %
ERYTHROCYTE [DISTWIDTH] IN BLOOD BY AUTOMATED COUNT: 13.8 %
EST. GFR  (AFRICAN AMERICAN): >60 ML/MIN/1.73 M^2
EST. GFR  (NON AFRICAN AMERICAN): >60 ML/MIN/1.73 M^2
GLUCOSE SERPL-MCNC: 100 MG/DL
HCT VFR BLD AUTO: 42.3 %
HGB BLD-MCNC: 14 G/DL
LYMPHOCYTES # BLD AUTO: 1.3 K/UL
LYMPHOCYTES NFR BLD: 21.7 %
MCH RBC QN AUTO: 28.2 PG
MCHC RBC AUTO-ENTMCNC: 33.1 G/DL
MCV RBC AUTO: 85 FL
MONOCYTES # BLD AUTO: 0.5 K/UL
MONOCYTES NFR BLD: 8.1 %
NEUTROPHILS # BLD AUTO: 4.1 K/UL
NEUTROPHILS NFR BLD: 67.8 %
PLATELET # BLD AUTO: 250 K/UL
PMV BLD AUTO: 8 FL
POTASSIUM SERPL-SCNC: 4.1 MMOL/L
RBC # BLD AUTO: 4.96 M/UL
SODIUM SERPL-SCNC: 140 MMOL/L
WBC # BLD AUTO: 6.1 K/UL

## 2018-04-30 PROCEDURE — 36415 COLL VENOUS BLD VENIPUNCTURE: CPT

## 2018-04-30 PROCEDURE — 85025 COMPLETE CBC W/AUTO DIFF WBC: CPT

## 2018-04-30 PROCEDURE — 80048 BASIC METABOLIC PNL TOTAL CA: CPT

## 2018-04-30 PROCEDURE — 99900104 DSU ONLY-NO CHARGE-EA ADD'L HR (STAT)

## 2018-04-30 PROCEDURE — 99900103 DSU ONLY-NO CHARGE-INITIAL HR (STAT)

## 2018-05-07 ENCOUNTER — ANESTHESIA EVENT (OUTPATIENT)
Dept: SURGERY | Facility: HOSPITAL | Age: 68
End: 2018-05-07
Payer: MEDICARE

## 2018-05-08 ENCOUNTER — ANESTHESIA (OUTPATIENT)
Dept: SURGERY | Facility: HOSPITAL | Age: 68
End: 2018-05-08
Payer: MEDICARE

## 2018-05-08 ENCOUNTER — HOSPITAL ENCOUNTER (OUTPATIENT)
Facility: HOSPITAL | Age: 68
Discharge: HOME | End: 2018-05-08
Attending: ORTHOPAEDIC SURGERY | Admitting: ORTHOPAEDIC SURGERY
Payer: MEDICARE

## 2018-05-08 DIAGNOSIS — S46.011A TRAUMATIC TEAR OF RIGHT ROTATOR CUFF: Primary | ICD-10-CM

## 2018-05-08 DIAGNOSIS — S46.011A TRAUMATIC TEAR OF RIGHT ROTATOR CUFF, UNSPECIFIED TEAR EXTENT, INITIAL ENCOUNTER: ICD-10-CM

## 2018-05-08 PROCEDURE — 36000711: Performed by: ORTHOPAEDIC SURGERY

## 2018-05-08 PROCEDURE — 63600175 PHARM REV CODE 636 W HCPCS: Performed by: ANESTHESIOLOGY

## 2018-05-08 PROCEDURE — 25000003 PHARM REV CODE 250: Performed by: ORTHOPAEDIC SURGERY

## 2018-05-08 PROCEDURE — 71000016 HC POSTOP RECOV ADDL HR: Performed by: ORTHOPAEDIC SURGERY

## 2018-05-08 PROCEDURE — 27200664 HC NERVE BLOCK COMPLETE KIT: Performed by: ANESTHESIOLOGY

## 2018-05-08 PROCEDURE — 76942 ECHO GUIDE FOR BIOPSY: CPT | Mod: 26,,, | Performed by: ANESTHESIOLOGY

## 2018-05-08 PROCEDURE — 27201423 OPTIME MED/SURG SUP & DEVICES STERILE SUPPLY: Performed by: ORTHOPAEDIC SURGERY

## 2018-05-08 PROCEDURE — 63600175 PHARM REV CODE 636 W HCPCS: Performed by: NURSE ANESTHETIST, CERTIFIED REGISTERED

## 2018-05-08 PROCEDURE — 25000003 PHARM REV CODE 250: Performed by: NURSE ANESTHETIST, CERTIFIED REGISTERED

## 2018-05-08 PROCEDURE — 76942 ECHO GUIDE FOR BIOPSY: CPT | Performed by: ANESTHESIOLOGY

## 2018-05-08 PROCEDURE — 63600175 PHARM REV CODE 636 W HCPCS

## 2018-05-08 PROCEDURE — 99900104 DSU ONLY-NO CHARGE-EA ADD'L HR (STAT): Performed by: ORTHOPAEDIC SURGERY

## 2018-05-08 PROCEDURE — 71000015 HC POSTOP RECOV 1ST HR: Performed by: ORTHOPAEDIC SURGERY

## 2018-05-08 PROCEDURE — C1713 ANCHOR/SCREW BN/BN,TIS/BN: HCPCS | Performed by: ORTHOPAEDIC SURGERY

## 2018-05-08 PROCEDURE — 37000008 HC ANESTHESIA 1ST 15 MINUTES: Performed by: ORTHOPAEDIC SURGERY

## 2018-05-08 PROCEDURE — C2626 INFUSION PUMP, NON-PROG,TEMP: HCPCS | Performed by: ANESTHESIOLOGY

## 2018-05-08 PROCEDURE — 37000009 HC ANESTHESIA EA ADD 15 MINS: Performed by: ORTHOPAEDIC SURGERY

## 2018-05-08 PROCEDURE — 25000003 PHARM REV CODE 250: Performed by: ANESTHESIOLOGY

## 2018-05-08 PROCEDURE — 36000710: Performed by: ORTHOPAEDIC SURGERY

## 2018-05-08 PROCEDURE — 99900103 DSU ONLY-NO CHARGE-INITIAL HR (STAT): Performed by: ORTHOPAEDIC SURGERY

## 2018-05-08 PROCEDURE — D9220A PRA ANESTHESIA: Mod: CRNA,,, | Performed by: NURSE ANESTHETIST, CERTIFIED REGISTERED

## 2018-05-08 PROCEDURE — 25000003 PHARM REV CODE 250

## 2018-05-08 PROCEDURE — 71000033 HC RECOVERY, INTIAL HOUR: Performed by: ORTHOPAEDIC SURGERY

## 2018-05-08 PROCEDURE — 63600175 PHARM REV CODE 636 W HCPCS: Performed by: ORTHOPAEDIC SURGERY

## 2018-05-08 PROCEDURE — 64416 NJX AA&/STRD BRCH PL NFS IMG: CPT | Performed by: ANESTHESIOLOGY

## 2018-05-08 PROCEDURE — D9220A PRA ANESTHESIA: Mod: ANES,,, | Performed by: ANESTHESIOLOGY

## 2018-05-08 PROCEDURE — 64416 NJX AA&/STRD BRCH PL NFS IMG: CPT | Mod: 59,RT,, | Performed by: ANESTHESIOLOGY

## 2018-05-08 DEVICE — ANCHOR SUT BIO COMP SWIVEL: Type: IMPLANTABLE DEVICE | Site: SHOULDER | Status: FUNCTIONAL

## 2018-05-08 DEVICE — CORKSCREW BIOCOMPOSITE 5.5MM: Type: IMPLANTABLE DEVICE | Site: SHOULDER | Status: FUNCTIONAL

## 2018-05-08 RX ORDER — OXYCODONE HYDROCHLORIDE 5 MG/1
5 TABLET ORAL ONCE AS NEEDED
Status: DISCONTINUED | OUTPATIENT
Start: 2018-05-08 | End: 2018-05-08 | Stop reason: HOSPADM

## 2018-05-08 RX ORDER — CEFAZOLIN SODIUM 2 G/50ML
2 SOLUTION INTRAVENOUS
Status: COMPLETED | OUTPATIENT
Start: 2018-05-08 | End: 2018-05-08

## 2018-05-08 RX ORDER — SODIUM CHLORIDE 0.9 G/100ML
IRRIGANT IRRIGATION
Status: DISCONTINUED | OUTPATIENT
Start: 2018-05-08 | End: 2018-05-08 | Stop reason: HOSPADM

## 2018-05-08 RX ORDER — LIDOCAINE HYDROCHLORIDE 10 MG/ML
0.5 INJECTION, SOLUTION EPIDURAL; INFILTRATION; INTRACAUDAL; PERINEURAL ONCE
Status: DISCONTINUED | OUTPATIENT
Start: 2018-05-08 | End: 2018-05-08 | Stop reason: HOSPADM

## 2018-05-08 RX ORDER — PROPOFOL 10 MG/ML
VIAL (ML) INTRAVENOUS
Status: DISCONTINUED | OUTPATIENT
Start: 2018-05-08 | End: 2018-05-08

## 2018-05-08 RX ORDER — OXYCODONE AND ACETAMINOPHEN 5; 325 MG/1; MG/1
1 TABLET ORAL EVERY 4 HOURS PRN
Status: DISCONTINUED | OUTPATIENT
Start: 2018-05-08 | End: 2018-05-08 | Stop reason: HOSPADM

## 2018-05-08 RX ORDER — FENTANYL CITRATE 50 UG/ML
INJECTION, SOLUTION INTRAMUSCULAR; INTRAVENOUS
Status: DISCONTINUED | OUTPATIENT
Start: 2018-05-08 | End: 2018-05-08

## 2018-05-08 RX ORDER — LIDOCAINE HCL/PF 100 MG/5ML
SYRINGE (ML) INTRAVENOUS
Status: DISCONTINUED | OUTPATIENT
Start: 2018-05-08 | End: 2018-05-08

## 2018-05-08 RX ORDER — DEXAMETHASONE SODIUM PHOSPHATE 4 MG/ML
INJECTION, SOLUTION INTRA-ARTICULAR; INTRALESIONAL; INTRAMUSCULAR; INTRAVENOUS; SOFT TISSUE
Status: DISCONTINUED | OUTPATIENT
Start: 2018-05-08 | End: 2018-05-08

## 2018-05-08 RX ORDER — SUCCINYLCHOLINE CHLORIDE 20 MG/ML
INJECTION INTRAMUSCULAR; INTRAVENOUS
Status: DISCONTINUED | OUTPATIENT
Start: 2018-05-08 | End: 2018-05-08

## 2018-05-08 RX ORDER — FENTANYL CITRATE 50 UG/ML
25 INJECTION, SOLUTION INTRAMUSCULAR; INTRAVENOUS EVERY 5 MIN PRN
Status: DISCONTINUED | OUTPATIENT
Start: 2018-05-08 | End: 2018-05-08 | Stop reason: HOSPADM

## 2018-05-08 RX ORDER — MIDAZOLAM HYDROCHLORIDE 1 MG/ML
INJECTION, SOLUTION INTRAMUSCULAR; INTRAVENOUS
Status: DISCONTINUED | OUTPATIENT
Start: 2018-05-08 | End: 2018-05-08

## 2018-05-08 RX ORDER — ONDANSETRON 4 MG/1
8 TABLET, ORALLY DISINTEGRATING ORAL EVERY 8 HOURS PRN
Status: DISCONTINUED | OUTPATIENT
Start: 2018-05-08 | End: 2018-05-08 | Stop reason: HOSPADM

## 2018-05-08 RX ORDER — OXYCODONE AND ACETAMINOPHEN 5; 325 MG/1; MG/1
1 TABLET ORAL EVERY 4 HOURS PRN
Qty: 40 TABLET | Refills: 0 | Status: SHIPPED | OUTPATIENT
Start: 2018-05-08 | End: 2018-05-21 | Stop reason: CLARIF

## 2018-05-08 RX ORDER — ROPIVACAINE HYDROCHLORIDE 5 MG/ML
INJECTION, SOLUTION EPIDURAL; INFILTRATION; PERINEURAL
Status: DISCONTINUED | OUTPATIENT
Start: 2018-05-08 | End: 2018-05-08

## 2018-05-08 RX ORDER — SODIUM CHLORIDE, SODIUM LACTATE, POTASSIUM CHLORIDE, CALCIUM CHLORIDE 600; 310; 30; 20 MG/100ML; MG/100ML; MG/100ML; MG/100ML
INJECTION, SOLUTION INTRAVENOUS CONTINUOUS
Status: DISCONTINUED | OUTPATIENT
Start: 2018-05-08 | End: 2018-05-08 | Stop reason: HOSPADM

## 2018-05-08 RX ORDER — LIDOCAINE HYDROCHLORIDE 10 MG/ML
INJECTION, SOLUTION EPIDURAL; INFILTRATION; INTRACAUDAL; PERINEURAL
Status: COMPLETED
Start: 2018-05-08 | End: 2018-05-08

## 2018-05-08 RX ORDER — ONDANSETRON 4 MG/1
8 TABLET, ORALLY DISINTEGRATING ORAL EVERY 6 HOURS PRN
Qty: 30 TABLET | Refills: 1 | Status: SHIPPED | OUTPATIENT
Start: 2018-05-08 | End: 2019-02-27

## 2018-05-08 RX ORDER — KETOROLAC TROMETHAMINE 30 MG/ML
INJECTION, SOLUTION INTRAMUSCULAR; INTRAVENOUS
Status: DISCONTINUED | OUTPATIENT
Start: 2018-05-08 | End: 2018-05-08

## 2018-05-08 RX ORDER — SODIUM CHLORIDE 0.9 % (FLUSH) 0.9 %
5 SYRINGE (ML) INJECTION
Status: DISCONTINUED | OUTPATIENT
Start: 2018-05-08 | End: 2018-05-08 | Stop reason: HOSPADM

## 2018-05-08 RX ORDER — ONDANSETRON 2 MG/ML
INJECTION INTRAMUSCULAR; INTRAVENOUS
Status: DISCONTINUED | OUTPATIENT
Start: 2018-05-08 | End: 2018-05-08

## 2018-05-08 RX ORDER — GLYCOPYRROLATE 0.2 MG/ML
INJECTION INTRAMUSCULAR; INTRAVENOUS
Status: DISCONTINUED | OUTPATIENT
Start: 2018-05-08 | End: 2018-05-08

## 2018-05-08 RX ORDER — EPINEPHRINE 1 MG/ML
INJECTION, SOLUTION INTRACARDIAC; INTRAMUSCULAR; INTRAVENOUS; SUBCUTANEOUS
Status: DISCONTINUED | OUTPATIENT
Start: 2018-05-08 | End: 2018-05-08 | Stop reason: HOSPADM

## 2018-05-08 RX ORDER — ACETAMINOPHEN 10 MG/ML
INJECTION, SOLUTION INTRAVENOUS
Status: DISCONTINUED | OUTPATIENT
Start: 2018-05-08 | End: 2018-05-08

## 2018-05-08 RX ORDER — HYDROMORPHONE HYDROCHLORIDE 2 MG/ML
1 INJECTION, SOLUTION INTRAMUSCULAR; INTRAVENOUS; SUBCUTANEOUS EVERY 4 HOURS PRN
Status: DISCONTINUED | OUTPATIENT
Start: 2018-05-08 | End: 2018-05-08 | Stop reason: HOSPADM

## 2018-05-08 RX ORDER — NEOSTIGMINE METHYLSULFATE 1 MG/ML
INJECTION, SOLUTION INTRAVENOUS
Status: DISCONTINUED | OUTPATIENT
Start: 2018-05-08 | End: 2018-05-08

## 2018-05-08 RX ORDER — CEFAZOLIN SODIUM 2 G/50ML
2 SOLUTION INTRAVENOUS
Status: DISCONTINUED | OUTPATIENT
Start: 2018-05-08 | End: 2018-05-08 | Stop reason: HOSPADM

## 2018-05-08 RX ORDER — ROCURONIUM BROMIDE 10 MG/ML
INJECTION, SOLUTION INTRAVENOUS
Status: DISCONTINUED | OUTPATIENT
Start: 2018-05-08 | End: 2018-05-08

## 2018-05-08 RX ADMIN — LIDOCAINE HYDROCHLORIDE 20 MG: 10 INJECTION, SOLUTION EPIDURAL; INFILTRATION; INTRACAUDAL; PERINEURAL at 06:05

## 2018-05-08 RX ADMIN — ROCURONIUM BROMIDE 20 MG: 10 INJECTION, SOLUTION INTRAVENOUS at 08:05

## 2018-05-08 RX ADMIN — LIDOCAINE HYDROCHLORIDE 100 MG: 20 INJECTION, SOLUTION INTRAVENOUS at 07:05

## 2018-05-08 RX ADMIN — MIDAZOLAM 2 MG: 1 INJECTION INTRAMUSCULAR; INTRAVENOUS at 07:05

## 2018-05-08 RX ADMIN — GLYCOPYRROLATE 0.2 MG: 0.2 INJECTION, SOLUTION INTRAMUSCULAR; INTRAVENOUS at 08:05

## 2018-05-08 RX ADMIN — SODIUM CHLORIDE, SODIUM GLUCONATE, SODIUM ACETATE, POTASSIUM CHLORIDE, MAGNESIUM CHLORIDE, SODIUM PHOSPHATE, DIBASIC, AND POTASSIUM PHOSPHATE: .53; .5; .37; .037; .03; .012; .00082 INJECTION, SOLUTION INTRAVENOUS at 06:05

## 2018-05-08 RX ADMIN — ACETAMINOPHEN 1000 MG: 10 INJECTION, SOLUTION INTRAVENOUS at 08:05

## 2018-05-08 RX ADMIN — ROPIVACAINE HYDROCHLORIDE 30 ML: 5 INJECTION, SOLUTION EPIDURAL; INFILTRATION; PERINEURAL at 07:05

## 2018-05-08 RX ADMIN — SUCCINYLCHOLINE CHLORIDE 140 MG: 20 INJECTION, SOLUTION INTRAMUSCULAR; INTRAVENOUS at 07:05

## 2018-05-08 RX ADMIN — ONDANSETRON 4 MG: 2 INJECTION, SOLUTION INTRAMUSCULAR; INTRAVENOUS at 08:05

## 2018-05-08 RX ADMIN — CEFAZOLIN SODIUM 2 G: 2 SOLUTION INTRAVENOUS at 07:05

## 2018-05-08 RX ADMIN — FENTANYL CITRATE 50 MCG: 50 INJECTION, SOLUTION INTRAMUSCULAR; INTRAVENOUS at 07:05

## 2018-05-08 RX ADMIN — ROPIVACAINE HYDROCHLORIDE: 2 INJECTION, SOLUTION EPIDURAL; INFILTRATION at 10:05

## 2018-05-08 RX ADMIN — ROCURONIUM BROMIDE 10 MG: 10 INJECTION, SOLUTION INTRAVENOUS at 07:05

## 2018-05-08 RX ADMIN — DEXAMETHASONE SODIUM PHOSPHATE 8 MG: 4 INJECTION, SOLUTION INTRAMUSCULAR; INTRAVENOUS at 08:05

## 2018-05-08 RX ADMIN — PROPOFOL 200 MG: 10 INJECTION, EMULSION INTRAVENOUS at 07:05

## 2018-05-08 RX ADMIN — GLYCOPYRROLATE 0.6 MG: 0.2 INJECTION, SOLUTION INTRAMUSCULAR; INTRAVENOUS at 10:05

## 2018-05-08 RX ADMIN — KETOROLAC TROMETHAMINE 30 MG: 30 INJECTION, SOLUTION INTRAMUSCULAR; INTRAVENOUS at 10:05

## 2018-05-08 RX ADMIN — FENTANYL CITRATE 25 MCG: 50 INJECTION, SOLUTION INTRAMUSCULAR; INTRAVENOUS at 10:05

## 2018-05-08 RX ADMIN — NEOSTIGMINE METHYLSULFATE 5 MG: 1 INJECTION INTRAVENOUS at 10:05

## 2018-05-08 RX ADMIN — SODIUM CHLORIDE, SODIUM GLUCONATE, SODIUM ACETATE, POTASSIUM CHLORIDE, MAGNESIUM CHLORIDE, SODIUM PHOSPHATE, DIBASIC, AND POTASSIUM PHOSPHATE: .53; .5; .37; .037; .03; .012; .00082 INJECTION, SOLUTION INTRAVENOUS at 08:05

## 2018-05-08 NOTE — TRANSFER OF CARE
"Anesthesia Transfer of Care Note    Patient: Jorgito Mora III    Procedure(s) Performed: Procedure(s) (LRB):  REPAIR ROTATOR CUFF ARTHROSCOPIC (Right)    Patient location: PACU    Anesthesia Type: general    Transport from OR: Transported from OR on 2-3 L/min O2 by NC with adequate spontaneous ventilation    Post pain: adequate analgesia    Post assessment: no apparent anesthetic complications    Post vital signs: stable    Level of consciousness: responds to stimulation    Nausea/Vomiting: no nausea/vomiting    Complications: none    Transfer of care protocol was followed      Last vitals:   Visit Vitals  /62 (BP Location: Left arm, Patient Position: Lying)   Pulse 60   Temp 36.8 °C (98.2 °F) (Skin)   Resp 16   Ht 5' 10" (1.778 m)   Wt 108.9 kg (240 lb)   SpO2 97%   BMI 34.44 kg/m²     "

## 2018-05-08 NOTE — PLAN OF CARE
Pt lying in bed sleeping/snoring. No signs/symptoms of pain or distress. Family updated per MD upon pt arrival to recovery room.

## 2018-05-08 NOTE — BRIEF OP NOTE
[facility]  Brief Operative Note     SUMMARY     Surgery Date: 5/8/2018     Surgeon(s) and Role:     * Trell Chase Jr., MD - Primary    Assisting Surgeon: None    Pre-op Diagnosis:  Traumatic tear of right rotator cuff, unspecified tear extent, initial encounter [S46.011A]    Post-op Diagnosis:  Post-Op Diagnosis Codes:     * Traumatic tear of right rotator cuff, unspecified tear extent, initial encounter [S46.011A]    Procedure(s) (LRB):  REPAIR ROTATOR CUFF ARTHROSCOPIC (Right)    Anesthesia: General    Description of the findings of the procedure: old scar ,,prior r c tear,  New r c tear    Estimated Blood Loss: 0* No values recorded between 5/8/2018  8:22 AM and 5/8/2018 10:21 AM *         Specimens:   Specimen (12h ago through future)    None          Discharge Note    SUMMARY     Admit Date: 5/8/2018    Discharge Date and Time:  05/08/2018 10:23 AM    Hospital Course (synopsis of major diagnoses, care, treatment, and services provided during the course of the hospital stay): Uneventful Outpatient Surgery     Final Diagnosis: Post-Op Diagnosis Codes:     * Traumatic tear of right rotator cuff, unspecified tear extent, initial encounter [S46.011A]    Disposition: Home or Self Care    Follow Up/Patient Instructions:     Medications:  Reconciled Home Medications: Current Discharge Medication List      START taking these medications    Details   ondansetron (ZOFRAN-ODT) 4 MG TbDL Take 2 tablets (8 mg total) by mouth every 6 (six) hours as needed.  Qty: 30 tablet, Refills: 1      oxyCODONE-acetaminophen (PERCOCET) 5-325 mg per tablet Take 1 tablet by mouth every 4 (four) hours as needed for Pain.  Qty: 40 tablet, Refills: 0         CONTINUE these medications which have NOT CHANGED    Details   atorvastatin (LIPITOR) 80 MG tablet Take 40 mg by mouth every evening.       busPIRone (BUSPAR) 10 MG tablet Take 20 mg by mouth 3 (three) times daily.       clotrimazole (LOTRIMIN) 1 % Soln Apply 1  application topically 2 (two) times daily.       DULoxetine (CYMBALTA) 20 MG capsule Take 20 mg by mouth 2 (two) times daily.      gabapentin (NEURONTIN) 400 MG capsule Take 400 mg by mouth 3 (three) times daily.      methocarbamol (ROBAXIN) 750 MG Tab Take 500 mg by mouth 3 (three) times daily.      oxybutynin (DITROPAN) 5 MG Tab Take 5 mg by mouth 3 (three) times daily.      oxyCODONE-acetaminophen (PERCOCET)  mg per tablet Take 1 tablet by mouth every 6 (six) hours as needed for Pain.  Qty: 30 tablet, Refills: 0    Associated Diagnoses: Traumatic complete tear of right rotator cuff, initial encounter      pantoprazole (PROTONIX) 40 MG tablet Take 40 mg by mouth once daily.      urea (CARMOL) 10 % cream Apply 1 application topically as needed.       VITAMIN B COMPLEX ORAL Take 1 tablet by mouth Daily.       BED UNDERPADS MISC by Misc.(Non-Drug; Combo Route) route.      DIAPER,BRIEF,ADULT, DISPOSABLE (DEPEND UNDERWEAR FOR MEN L-XL MISC) by Misc.(Non-Drug; Combo Route) route.      INCONTINENCE PAD,LINER,DISP (POISE PADS EXTRA PLUS MISC) by Misc.(Non-Drug; Combo Route) route.      ketoconazole (NIZORAL) 2 % shampoo Apply 1 application topically twice a week.       naproxen (NAPROSYN) 500 MG tablet Take 500 mg by mouth 2 (two) times daily with meals.             Discharge Procedure Orders  SLING [employer]THOPEDIC LARGE FOR HOME USE     Diet general     Ice to affected area     Lifting restrictions     No driving, operating heavy equipment or signing legal documents while taking pain medication     Call MD for:  temperature >100.4     Call MD for:  persistent nausea and vomiting     Call MD for:  severe uncontrolled pain     Call MD for:  difficulty breathing, headache or visual disturbances     Call MD for:  redness, tenderness, or signs of infection (pain, swelling, redness, odor or green/yellow discharge around incision site)     Call MD for:  hives     Call MD for:  persistent dizziness or light-headedness     Call  MD for:  extreme fatigue     Shower on day dressing removed (No bath)     Remove dressing in 72 hours       Follow-up Information     Trell Chase Jr, MD In 2 weeks.    Specialties:  Orthopedic Surgery, General Surgery, Surgery  Contact information:  [facility] [address] [address] [phone]

## 2018-05-08 NOTE — ADDENDUM NOTE
Addendum  created 05/08/18 1229 by Amanda Ryan CRNA    Anesthesia Event edited, Anesthesia Intra Flowsheets edited

## 2018-05-08 NOTE — ANESTHESIA POSTPROCEDURE EVALUATION
"Anesthesia Post Evaluation    Patient: Jorgito Mora III    Procedure(s) Performed: Procedure(s) (LRB):  REPAIR ROTATOR CUFF ARTHROSCOPIC (Right)    Final Anesthesia Type: general  Patient location during evaluation: PACU  Patient participation: Yes- Able to Participate  Level of consciousness: awake and alert and oriented  Post-procedure vital signs: reviewed and stable  Pain management: adequate  Airway patency: patent  PONV status at discharge: No PONV  Anesthetic complications: no      Cardiovascular status: hemodynamically stable and blood pressure returned to baseline  Respiratory status: unassisted, spontaneous ventilation and room air  Hydration status: euvolemic  Follow-up not needed.        Visit Vitals  BP (!) 147/74 (BP Location: Left arm, Patient Position: Lying)   Pulse 64   Temp 36.5 °C (97.7 °F) (Temporal)   Resp 14   Ht 5' 10" (1.778 m)   Wt 108.9 kg (240 lb)   SpO2 (!) 93%   BMI 34.44 kg/m²       Pain/Jenifer Score: Pain Assessment Performed: Yes (5/8/2018 10:35 AM)  Presence of Pain: denies (5/8/2018 11:15 AM)  Jenifer Score: 10 (5/8/2018 11:15 AM)      "

## 2018-05-08 NOTE — ANESTHESIA PROCEDURE NOTES
Peripheral    Patient location during procedure: pre-op   Block not for primary anesthetic.  Reason for block: at surgeon's request and post-op pain management   Post-op Pain Location: shoulder pain, right  Start time: 5/8/2018 7:02 AM  Timeout: 5/8/2018 6:59 AM   End time: 5/8/2018 7:14 AM  Surgery related to: right shoulder arthroscopy  Staffing  Anesthesiologist: ALECIA SMITH  Performed: anesthesiologist   Preanesthetic Checklist  Completed: patient identified, site marked, surgical consent, pre-op evaluation, timeout performed, IV checked, risks and benefits discussed and monitors and equipment checked  Peripheral Block  Patient position: supine  Prep: ChloraPrep and site prepped and draped  Patient monitoring: heart rate, cardiac monitor, continuous pulse ox and frequent blood pressure checks  Block type: interscalene  Laterality: right  Injection technique: continuous  Needle  Needle type: Tuohy   Needle gauge: 18 G  Needle length: 2 in  Needle localization: nerve stimulator and ultrasound guidance  Needle insertion depth: 4 cm  Catheter type: non-stimulating  Catheter size: 20 G  Catheter at skin depth: 5 cm  Test dose: lidocaine 1.5% with Epi 1-to-200,000 and negative   -ultrasound image captured on disc.  Assessment  Injection assessment: negative aspiration, negative parasthesia and local visualized surrounding nerve  Paresthesia pain: none  Heart rate change: no  Slow fractionated injection: yes  Medications:  Bolus administered: 30 mL of 0.5 ropivacaine  Additional Notes  Secured with benzoin, dermaflex and steri-strips. Covered with tegaderm dressing.

## 2018-05-08 NOTE — PLAN OF CARE
Meets criteria for discharge. Discharged to home with wife. Denies nausea. Tolerating fluids. Denies pain. Steady gait with assist. Voiding without difficulty. Discharge instructions reviewed, including On Q pain ball care/removal, and printed handout given. Verbalized understanding.

## 2018-05-08 NOTE — OP NOTE
DATE OF PROCEDURE:  05/08/2018    PROCEDURE:  Right shoulder arthroscopy with lysis of adhesions, subacromial arch   decompression and rotator cuff repair.    PREOPERATIVE DIAGNOSIS:  Rotator cuff tear, right shoulder and prior rotator   cuff repair, right shoulder.    ANESTHESIA:  General.    SURGEON:  Trell Chase Jr., M.D.    ASSISTANT:  Logan Wood.    PROCEDURE IN DETAIL:  The patient was taken to the Operating Room and placed   under general anesthesia, then turned in the lateral position on the beanbag   with right arm in skin traction, then prepped and draped the right shoulder in   satisfactory manner.  Introduced our spinal needle to posterior joint followed   with the scope posterior in the joint and then made an anterior portal from   inside out.  Inside the joint, we could see minimal degenerative labral tissue,   which we cleaned up.  We had a nice biceps attachment.  The labral tissue   anterior and posterior appeared to be in good condition.  Humeral head was in   good condition and the glenoid surface was in good condition.  Biceps looked   good.  We could see an obvious large rotator cuff tear from below that looked   more acute been chronic with retraction.  We cleaned up the labral tissue and   some of the loose edges of the rotator cuff from below and then we went   subdeltoid, made a lateral portal and also made accessory anterolateral portal   and in the subdeltoid space, there was lots of very thick bursal tissue and   scar, so we worked our way into that space cleaning off any scar and bursa from   underneath of the acromion and then worked our way around starting at that point   trying to identify what was rotator cuff and what was old thick scar.  He did   have a prior rotator cuff tear and as we got to the footprint area, we could to   prior sutures probably through tunnels and we removed those sutures, but clearly   there have been a prior rotator cuff tear and also piece of stitch in  the   remaining rotator cuff tissue.  Now as we cleaned this off, we had a lot of scar   to remove anterior.  We also did some additional subacromial arch decompression   back to the AC joint.  It was a very tight anterior and very thick bursal   tissue.  It took a while to clean that out, but once it was cleaned out, we   could then appreciate, he did have a big tear.  It looked more acute than   chronic and we mobilized it from the glenoid and we could mobilize it mainly   from posterior to anterior and it looked like a two tendon tear.  So after it   was nicely mobilized, we then began our repair.  We used two side-to-side   sutures pulling along that rotator interval and pulled that corner up anterior   that helped a lot.  I then started with our anterior anchor and put on our   medial anchor passed our 4 sutures through the rotator cuff and left them out   the front portal, then put a second anchor posterior in the footprint.  We have   prior cleaned out the footprint and ground down any soft tissues.  So we had a   nice clean footprint.  We put our second anchor posterior in that footprint,   passed those sutures through the posterior portion of our rotator cuff tear and   then we began trying our bundles working from posterior to anterior, tied the   four bundles of sutures and then we used our crisscrossed technique and used our   lateral push locks to get two lateral push locks anchors and this did very   nicely covered our big hole in the rotator cuff.  As we finished, there was a   little dog ear anterior enough that I could put a suture through and I also used   a separate lateral PushLock to anchor that anterior corner and help reduce that   little dog ear anterior that took 2 lateral PushLock though the first one was   misfired, but the second one was nicely anchored and as we got done, we had   covered the footprint very nicely.  No bone exposed.  I made sure that we had an   adequate XAVI.  We then  removed our instruments, used some Marcaine with   epinephrine at the portals, sutured the portals with 4-0 nylon and then applied   a soft dressing and rotator cuff sling.  He was then taken to Recovery Room in   satisfactory condition.      MELIDA/IN  dd: 05/08/2018 10:28:44 (CDT)  td: 05/08/2018 16:33:07 (CDT)  Doc ID   #[id number]  Job ID #[id number]    CC:

## 2018-05-08 NOTE — ANESTHESIA PREPROCEDURE EVALUATION
05/08/2018  Jorgito Mora III is a 68 y.o., male.    Anesthesia Evaluation      I have reviewed the Medications.     Review of Systems  Anesthesia Hx:  No problems with previous Anesthesia Prior Eschmann bougie intubation   Social:  Non-Smoker, No Alcohol Use   Cardiovascular:   hyperlipidemia    Pulmonary:   Sleep Apnea, CPAP    Renal/:  Renal/ Normal     Hepatic/GI:  Hepatic/GI Normal    Neurological:   Peripheral Neuropathy    Endocrine:  Endocrine Normal    Psych:   Psychiatric History (PTSD) anxiety depression          Physical Exam  General:  Obesity    Airway/Jaw/Neck:  Airway Findings: Mouth Opening: Normal General Airway Assessment: Adult  Mallampati: III  Jaw/Neck Findings:  Neck ROM: Extension Decreased, Mild      Dental:  Dental Findings: Prominent Incisors   Chest/Lungs:  Chest/Lungs Findings: Clear to auscultation, Normal Respiratory Rate     Heart/Vascular:  Heart Findings: Rate: Normal  Rhythm: Regular Rhythm  Sounds: Normal  Heart murmur: negative Vascular Findings: Normal (No carotid bruits.)       Mental Status:  Mental Status Findings:  Cooperative, Alert and Oriented         Anesthesia Plan  Type of Anesthesia, risks & benefits discussed:  Anesthesia Type:  general  Patient's Preference:   Intra-op Monitoring Plan:   Intra-op Monitoring Plan Comments:   Post Op Pain Control Plan: peripheral nerve block  Post Op Pain Control Plan Comments:   Induction:   IV  Beta Blocker:  Patient is not currently on a Beta-Blocker (No further documentation required).       Informed Consent: Patient understands risks and agrees with Anesthesia plan.  Questions answered. Anesthesia consent signed with patient.  ASA Score: 2     Day of Surgery Review of History & Physical:            Ready For Surgery From Anesthesia Perspective.

## 2018-05-09 VITALS
BODY MASS INDEX: 34.36 KG/M2 | HEART RATE: 57 BPM | TEMPERATURE: 98 F | WEIGHT: 240 LBS | RESPIRATION RATE: 16 BRPM | OXYGEN SATURATION: 96 % | SYSTOLIC BLOOD PRESSURE: 141 MMHG | DIASTOLIC BLOOD PRESSURE: 72 MMHG | HEIGHT: 70 IN

## 2018-05-09 NOTE — ANESTHESIA POST-OP PAIN MANAGEMENT
Acute Pain Service Progress Note    Jorgito Mora III is a 68 y.o., male, [id number].    Surgery:  Shoulder    Post Op Day #: 1    Catheter type: perineural  interscalene    Infusion type: Ropivacaine 0.2%  8 basal    Problem List:    Active Hospital Problems    Diagnosis  POA    *Traumatic tear of right rotator cuff [S46.011A]  Yes      Resolved Hospital Problems    Diagnosis Date Resolved POA   No resolved problems to display.       Subjective:     General appearance of alert, oriented, no complaints   Pain with rest: 2    Numbers   Pain with movement: 2    Numbers   Side Effects    1. Pruritis No    2. Nausea No    3. Motor Blockade No, 1=Ability to bend knees and ankles    4. Sedation No, 1=awake and alert    Objective:     Catheter level    Catheter site clean, dry, intact   Catheter placement     Vitals   Vitals:    05/08/18 1230   BP: (!) 141/72   Pulse: (!) 57   Resp: 16   Temp:         Labs    No visits with results within 2 Day(s) from this visit.   Latest known visit with results is:   Hospital Outpatient Visit on 04/30/2018   Component Date Value Ref Range Status    WBC 04/30/2018 6.10  3.90 - 12.70 K/uL Final    RBC 04/30/2018 4.96  4.60 - 6.20 M/uL Final    Hemoglobin 04/30/2018 14.0  14.0 - 18.0 g/dL Final    Hematocrit 04/30/2018 42.3  40.0 - 54.0 % Final    MCV 04/30/2018 85  82 - 98 fL Final    MCH 04/30/2018 28.2  27.0 - 31.0 pg Final    MCHC 04/30/2018 33.1  32.0 - 36.0 g/dL Final    RDW 04/30/2018 13.8  11.5 - 14.5 % Final    Platelets 04/30/2018 250  150 - 350 K/uL Final    MPV 04/30/2018 8.0* 9.2 - 12.9 fL Final    Gran # (ANC) 04/30/2018 4.1  1.8 - 7.7 K/uL Final    Lymph # 04/30/2018 1.3  1.0 - 4.8 K/uL Final    Mono # 04/30/2018 0.5  0.3 - 1.0 K/uL Final    Eos # 04/30/2018 0.1  0.0 - 0.5 K/uL Final    Baso # 04/30/2018 0.00  0.00 - 0.20 K/uL Final    Gran% 04/30/2018 67.8  38.0 - 73.0 % Final    Lymph% 04/30/2018 21.7  18.0 - 48.0 % Final    Mono% 04/30/2018 8.1  4.0  - 15.0 % Final    Eosinophil% 04/30/2018 2.0  0.0 - 8.0 % Final    Basophil% 04/30/2018 0.4  0.0 - 1.9 % Final    Differential Method 04/30/2018 Automated   Final    Sodium 04/30/2018 140  136 - 145 mmol/L Final    Potassium 04/30/2018 4.1  3.5 - 5.1 mmol/L Final    Chloride 04/30/2018 106  95 - 110 mmol/L Final    CO2 04/30/2018 26  23 - 29 mmol/L Final    Glucose 04/30/2018 100  70 - 110 mg/dL Final    BUN, Bld 04/30/2018 19  8 - 23 mg/dL Final    Creatinine 04/30/2018 0.8  0.5 - 1.4 mg/dL Final    Calcium 04/30/2018 9.7  8.7 - 10.5 mg/dL Final    Anion Gap 04/30/2018 8  8 - 16 mmol/L Final    eGFR if African American 04/30/2018 >60  >60 mL/min/1.73 m^2 Final    eGFR if non African American 04/30/2018 >60  >60 mL/min/1.73 m^2 Final        Meds   No current facility-administered medications for this encounter.      Current Outpatient Prescriptions   Medication Sig Dispense Refill    atorvastatin (LIPITOR) 80 MG tablet Take 40 mg by mouth every evening.       busPIRone (BUSPAR) 10 MG tablet Take 20 mg by mouth 3 (three) times daily.       clotrimazole (LOTRIMIN) 1 % Soln Apply 1 application topically 2 (two) times daily.       DULoxetine (CYMBALTA) 20 MG capsule Take 20 mg by mouth 2 (two) times daily.      gabapentin (NEURONTIN) 400 MG capsule Take 400 mg by mouth 3 (three) times daily.      methocarbamol (ROBAXIN) 750 MG Tab Take 500 mg by mouth 3 (three) times daily.      oxybutynin (DITROPAN) 5 MG Tab Take 5 mg by mouth 3 (three) times daily.      oxyCODONE-acetaminophen (PERCOCET)  mg per tablet Take 1 tablet by mouth every 6 (six) hours as needed for Pain. 30 tablet 0    pantoprazole (PROTONIX) 40 MG tablet Take 40 mg by mouth once daily.      urea (CARMOL) 10 % cream Apply 1 application topically as needed.       VITAMIN B COMPLEX ORAL Take 1 tablet by mouth Daily.       BED UNDERPADS MISC by Misc.(Non-Drug; Combo Route) route.      DIAPER,BRIEF,ADULT, DISPOSABLE (DEPEND  UNDERWEAR FOR MEN L-XL MISC) by Misc.(Non-Drug; Combo Route) route.      INCONTINENCE PAD,LINER,DISP (POISE PADS EXTRA PLUS MISC) by Misc.(Non-Drug; Combo Route) route.      ketoconazole (NIZORAL) 2 % shampoo Apply 1 application topically twice a week.       naproxen (NAPROSYN) 500 MG tablet Take 500 mg by mouth 2 (two) times daily with meals.      ondansetron (ZOFRAN-ODT) 4 MG TbDL Take 2 tablets (8 mg total) by mouth every 6 (six) hours as needed. 30 tablet 1    oxyCODONE-acetaminophen (PERCOCET) 5-325 mg per tablet Take 1 tablet by mouth every 4 (four) hours as needed for Pain. 40 tablet 0        Anticoagulant dose     Assessment:     Pain control adequate    Plan:     Patient doing well, continue present treatment.    Evaluator Coleman Massey

## 2018-05-10 NOTE — ANESTHESIA POST-OP PAIN MANAGEMENT
Anesthesia Acute Pain Service Follow up Note    Jorgito CHAU Morgan GUILLEN : 1950 MRN: [id number]      Date of Procedure: 2018    Procedure(s) (LRB):  REPAIR ROTATOR CUFF ARTHROSCOPIC (Right)  ARTHROSCOPY-SHOULDER WITH SUBACROMIAL DECOMPRESSION    Post-op: 2 Days Post-Op     Catheter type: perineural  interscalene          Pt contacted by phone at number provided during surgical visit.  Pt reports good pain with perineural catheter in place and functional. Pt denies signs and symptoms  of local anesthetic toxicity, denies erythema or induration at catheter insertion site, states that dressing remains intact.  All questions were answered, no complications or issues were identified at this time.      YOLANDE BRIAN MD  Department of Anesthesiology   [facility]

## 2018-05-11 NOTE — ADDENDUM NOTE
Addendum  created 05/11/18 1110 by Darren Corea MD    Anesthesia Event edited, Sign clinical note

## 2018-05-11 NOTE — ANESTHESIA POST-OP PAIN MANAGEMENT
Acute Pain Service Progress Note    Jorgito Mora III is a 68 y.o., male, [id number].    Surgery:  Shoulder    Post Op Day #: 3    Catheter type: perineural  interscalene    Infusion type: Ropivacaine 0.2%  8 basal    Problem List:    Active Hospital Problems    Diagnosis  POA    *Traumatic tear of right rotator cuff [S46.011A]  Yes      Resolved Hospital Problems    Diagnosis Date Resolved POA   No resolved problems to display.       Subjective:     General appearance of alert, oriented, no complaints   Pain with rest: 1   Pain with movement: 1   Side Effects    1. Pruritis No    2. Nausea No    3. Motor Blockade No,                             4. Sedation No,  Objective:        Catheter site clean, dry, intact        Vitals   Vitals:    05/08/18 1230   BP: (!) 141/72   Pulse: (!) 57   Resp: 16   Temp:         Labs    No visits with results within 2 Day(s) from this visit.   Latest known visit with results is:   Hospital Outpatient Visit on 04/30/2018   Component Date Value Ref Range Status    WBC 04/30/2018 6.10  3.90 - 12.70 K/uL Final    RBC 04/30/2018 4.96  4.60 - 6.20 M/uL Final    Hemoglobin 04/30/2018 14.0  14.0 - 18.0 g/dL Final    Hematocrit 04/30/2018 42.3  40.0 - 54.0 % Final    MCV 04/30/2018 85  82 - 98 fL Final    MCH 04/30/2018 28.2  27.0 - 31.0 pg Final    MCHC 04/30/2018 33.1  32.0 - 36.0 g/dL Final    RDW 04/30/2018 13.8  11.5 - 14.5 % Final    Platelets 04/30/2018 250  150 - 350 K/uL Final    MPV 04/30/2018 8.0* 9.2 - 12.9 fL Final    Gran # (ANC) 04/30/2018 4.1  1.8 - 7.7 K/uL Final    Lymph # 04/30/2018 1.3  1.0 - 4.8 K/uL Final    Mono # 04/30/2018 0.5  0.3 - 1.0 K/uL Final    Eos # 04/30/2018 0.1  0.0 - 0.5 K/uL Final    Baso # 04/30/2018 0.00  0.00 - 0.20 K/uL Final    Gran% 04/30/2018 67.8  38.0 - 73.0 % Final    Lymph% 04/30/2018 21.7  18.0 - 48.0 % Final    Mono% 04/30/2018 8.1  4.0 - 15.0 % Final    Eosinophil% 04/30/2018 2.0  0.0 - 8.0 % Final    Basophil%  04/30/2018 0.4  0.0 - 1.9 % Final    Differential Method 04/30/2018 Automated   Final    Sodium 04/30/2018 140  136 - 145 mmol/L Final    Potassium 04/30/2018 4.1  3.5 - 5.1 mmol/L Final    Chloride 04/30/2018 106  95 - 110 mmol/L Final    CO2 04/30/2018 26  23 - 29 mmol/L Final    Glucose 04/30/2018 100  70 - 110 mg/dL Final    BUN, Bld 04/30/2018 19  8 - 23 mg/dL Final    Creatinine 04/30/2018 0.8  0.5 - 1.4 mg/dL Final    Calcium 04/30/2018 9.7  8.7 - 10.5 mg/dL Final    Anion Gap 04/30/2018 8  8 - 16 mmol/L Final    eGFR if African American 04/30/2018 >60  >60 mL/min/1.73 m^2 Final    eGFR if non African American 04/30/2018 >60  >60 mL/min/1.73 m^2 Final        Meds   No current facility-administered medications for this encounter.      Current Outpatient Prescriptions   Medication Sig Dispense Refill    atorvastatin (LIPITOR) 80 MG tablet Take 40 mg by mouth every evening.       busPIRone (BUSPAR) 10 MG tablet Take 20 mg by mouth 3 (three) times daily.       clotrimazole (LOTRIMIN) 1 % Soln Apply 1 application topically 2 (two) times daily.       DULoxetine (CYMBALTA) 20 MG capsule Take 20 mg by mouth 2 (two) times daily.      gabapentin (NEURONTIN) 400 MG capsule Take 400 mg by mouth 3 (three) times daily.      methocarbamol (ROBAXIN) 750 MG Tab Take 500 mg by mouth 3 (three) times daily.      oxybutynin (DITROPAN) 5 MG Tab Take 5 mg by mouth 3 (three) times daily.      oxyCODONE-acetaminophen (PERCOCET)  mg per tablet Take 1 tablet by mouth every 6 (six) hours as needed for Pain. 30 tablet 0    pantoprazole (PROTONIX) 40 MG tablet Take 40 mg by mouth once daily.      urea (CARMOL) 10 % cream Apply 1 application topically as needed.       VITAMIN B COMPLEX ORAL Take 1 tablet by mouth Daily.       BED UNDERPADS MISC by Misc.(Non-Drug; Combo Route) route.      DIAPER,BRIEF,ADULT, DISPOSABLE (DEPEND UNDERWEAR FOR MEN L-XL MISC) by Misc.(Non-Drug; Combo Route) route.       INCONTINENCE PAD,LINER,DISP (POISE PADS EXTRA PLUS MISC) by Misc.(Non-Drug; Combo Route) route.      ketoconazole (NIZORAL) 2 % shampoo Apply 1 application topically twice a week.       naproxen (NAPROSYN) 500 MG tablet Take 500 mg by mouth 2 (two) times daily with meals.      ondansetron (ZOFRAN-ODT) 4 MG TbDL Take 2 tablets (8 mg total) by mouth every 6 (six) hours as needed. 30 tablet 1    oxyCODONE-acetaminophen (PERCOCET) 5-325 mg per tablet Take 1 tablet by mouth every 4 (four) hours as needed for Pain. 40 tablet 0             Assessment:     Pain control adequate     Plan:  Patient will discontinue catheter today.  Advised to call if he has any issues.      Evaluator  S Cousin

## 2018-05-21 ENCOUNTER — OFFICE VISIT (OUTPATIENT)
Dept: ORTHOPEDICS | Facility: CLINIC | Age: 68
End: 2018-05-21
Payer: MEDICARE

## 2018-05-21 VITALS
HEART RATE: 84 BPM | SYSTOLIC BLOOD PRESSURE: 158 MMHG | HEIGHT: 70 IN | BODY MASS INDEX: 34.93 KG/M2 | WEIGHT: 244 LBS | DIASTOLIC BLOOD PRESSURE: 78 MMHG

## 2018-05-21 DIAGNOSIS — S46.011D TRAUMATIC COMPLETE TEAR OF RIGHT ROTATOR CUFF, SUBSEQUENT ENCOUNTER: Primary | ICD-10-CM

## 2018-05-21 DIAGNOSIS — R60.0 EDEMA OF RIGHT LOWER EXTREMITY: ICD-10-CM

## 2018-05-21 DIAGNOSIS — S46.011A TRAUMATIC COMPLETE TEAR OF RIGHT ROTATOR CUFF, INITIAL ENCOUNTER: ICD-10-CM

## 2018-05-21 PROCEDURE — 99024 POSTOP FOLLOW-UP VISIT: CPT | Mod: ,,, | Performed by: ORTHOPAEDIC SURGERY

## 2018-05-21 RX ORDER — FUROSEMIDE 20 MG/1
20 TABLET ORAL DAILY
Qty: 10 TABLET | Refills: 0 | Status: SHIPPED | OUTPATIENT
Start: 2018-05-21 | End: 2021-11-11

## 2018-05-21 RX ORDER — OXYCODONE AND ACETAMINOPHEN 10; 325 MG/1; MG/1
1 TABLET ORAL EVERY 6 HOURS PRN
Qty: 30 TABLET | Refills: 0 | Status: SHIPPED | OUTPATIENT
Start: 2018-05-21 | End: 2018-05-28

## 2018-05-21 RX ORDER — CEPHALEXIN 500 MG/1
500 CAPSULE ORAL 2 TIMES DAILY
Qty: 20 CAPSULE | Refills: 0 | Status: SHIPPED | OUTPATIENT
Start: 2018-05-21 | End: 2018-05-31

## 2018-05-21 NOTE — PROGRESS NOTES
[facility]    Subjective:     Chief Complaint:   Chief Complaint   Patient presents with    Right Shoulder - Post-op Evaluation     Right shoulder scope (5/8/18) follow up and suture removal. States that the shoulder is feeling great. States that he is able to move his arm around some.        Past Medical History:   Diagnosis Date    Anxiety     Arthritis     Cancer 2005    Prostate    Depression     Encounter for blood transfusion     Hyperlipemia     Neuropathy     Obstructive sleep apnea on CPAP     PTSD (post-traumatic stress disorder)        Past Surgical History:   Procedure Laterality Date    BACK SURGERY      burnt nerves      ELBOW SURGERY  2010, 2008    left, right    GALLBLADDER SURGERY      JOINT REPLACEMENT  2007, 2012    Bilateral knees    KNEE SURGERY  2001, 2007    left and right knee    Neck surgery with plate      Crushed C2-3-4    prostate cancer      SHOULDER SURGERY  2004    right shoulder       Current Outpatient Prescriptions   Medication Sig    atorvastatin (LIPITOR) 80 MG tablet Take 40 mg by mouth every evening.     BED UNDERPADS MISC by Misc.(Non-Drug; Combo Route) route.    busPIRone (BUSPAR) 10 MG tablet Take 20 mg by mouth 3 (three) times daily.     clotrimazole (LOTRIMIN) 1 % Soln Apply 1 application topically 2 (two) times daily.     DIAPER,BRIEF,ADULT, DISPOSABLE (DEPEND UNDERWEAR FOR MEN L-XL MISC) by Misc.(Non-Drug; Combo Route) route.    DULoxetine (CYMBALTA) 20 MG capsule Take 20 mg by mouth 2 (two) times daily.    gabapentin (NEURONTIN) 400 MG capsule Take 400 mg by mouth 3 (three) times daily.    INCONTINENCE PAD,LINER,DISP (POISE PADS EXTRA PLUS MISC) by Misc.(Non-Drug; Combo Route) route.    ketoconazole (NIZORAL) 2 % shampoo Apply 1 application topically twice a week.     methocarbamol (ROBAXIN) 750 MG Tab Take 500 mg by mouth 3 (three) times daily.    naproxen (NAPROSYN) 500 MG tablet Take 500 mg by mouth 2 (two) times daily with  meals.    ondansetron (ZOFRAN-ODT) 4 MG TbDL Take 2 tablets (8 mg total) by mouth every 6 (six) hours as needed.    oxybutynin (DITROPAN) 5 MG Tab Take 5 mg by mouth 3 (three) times daily.    oxyCODONE-acetaminophen (PERCOCET)  mg per tablet Take 1 tablet by mouth every 6 (six) hours as needed for Pain.    pantoprazole (PROTONIX) 40 MG tablet Take 40 mg by mouth once daily.    urea (CARMOL) 10 % cream Apply 1 application topically as needed.     VITAMIN B COMPLEX ORAL Take 1 tablet by mouth Daily.     oxyCODONE-acetaminophen (PERCOCET) 5-325 mg per tablet Take 1 tablet by mouth every 4 (four) hours as needed for Pain.     No current facility-administered medications for this visit.        Review of patient's allergies indicates:  No Known Allergies    History reviewed. No pertinent family history.    Social History     Social History    Marital status:      Spouse name: N/A    Number of children: N/A    Years of education: N/A     Occupational History    Not on file.     Social History Main Topics    Smoking status: Former Smoker     Packs/day: 0.50     Years: 10.00     Types: Cigarettes    Smokeless tobacco: Never Used    Alcohol use Yes      Comment: rare socially     Drug use: No    Sexual activity: Not on file     Other Topics Concern    Not on file     Social History Narrative    No narrative on file       History of present illness: Follow-up right shoulder rotator cuff repair. He had some old scar and old repair a new tear to tendon which was fixed. 2 weeks ago. He's taking no pain medicine he has a sling on      Review of Systems:    Constitution: Negative for chills, fever, and sweats.  Negative for unexplained weight loss.    HENT:  Negative for headaches and blurry vision.    Cardiovascular:Negative for chest pain or irregular heart beat. Negative for hypertension.    Respiratory:  Negative for cough and shortness of breath.    Gastrointestinal: Negative for abdominal pain,  heartburn, melena, nausea, and vomitting.    Genitourinary:  Negative bladder incontinence and dysuria.    Musculoskeletal:  See HPI for details.     Neurological: Negative for numbness.    Psychiatric/Behavioral: Negative for depression.  The patient is not nervous/anxious.      Endocrine: Negative for polyuria    Hematologic/Lymphatic: Negative for bleeding problem.  Does not bruise/bleed easily.    Skin: Negative for poor would healing and rash    Objective:      Physical Examination:    Vital Signs:    Vitals:    05/21/18 1051   BP: (!) 158/78   Pulse: 84       Body mass index is 35.01 kg/m².    This a well-developed, well nourished patient in no acute distress.  They are alert and oriented and cooperative to examination.        Physical exam reveals the he has some redness around the anterior portal. No pus. Other levels look good. All the sutures are out. No significant swelling or ecchymosis around the shoulder  Pertinent New Results:    XRAY Report / Interpretation:       Assessment/Plan:      Impression healing rotator cuff repair. Plan circumduction exercise no therapy until 6 weeks. Pain medicine prescription for Percocet given today since he did not use the prescription at the time of surgery. Simple sling. Back in 4 weeks      This note was created using Dragon voice recognition software that occasionally misinterpreted phrases or words.

## 2018-06-13 ENCOUNTER — OFFICE VISIT (OUTPATIENT)
Dept: ORTHOPEDICS | Facility: CLINIC | Age: 68
End: 2018-06-13
Payer: MEDICARE

## 2018-06-13 VITALS
SYSTOLIC BLOOD PRESSURE: 156 MMHG | DIASTOLIC BLOOD PRESSURE: 87 MMHG | WEIGHT: 248 LBS | BODY MASS INDEX: 35.5 KG/M2 | HEART RATE: 99 BPM | HEIGHT: 70 IN

## 2018-06-13 DIAGNOSIS — S46.011D TRAUMATIC COMPLETE TEAR OF RIGHT ROTATOR CUFF, SUBSEQUENT ENCOUNTER: Primary | ICD-10-CM

## 2018-06-13 PROCEDURE — 99024 POSTOP FOLLOW-UP VISIT: CPT | Mod: ,,, | Performed by: ORTHOPAEDIC SURGERY

## 2018-06-13 NOTE — PROGRESS NOTES
[facility] POST-OP NOTE    Subjective:           Chief Complaint:   Chief Complaint   Patient presents with    Right Shoulder - Post-op Evaluation     Right Shoulder RCR 5/8/18. States that he is feeling good. States that at night his shoulder does lock up on his sometimes.        Past Medical History:   Diagnosis Date    Anxiety     Arthritis     Cancer 2005    Prostate    Depression     Encounter for blood transfusion     Hyperlipemia     Neuropathy     Obstructive sleep apnea on CPAP     PTSD (post-traumatic stress disorder)        Past Surgical History:   Procedure Laterality Date    BACK SURGERY      burnt nerves      ELBOW SURGERY  2010, 2008    left, right    GALLBLADDER SURGERY      JOINT REPLACEMENT  2007, 2012    Bilateral knees    KNEE SURGERY  2001, 2007    left and right knee    Neck surgery with plate      Crushed C2-3-4    prostate cancer      SHOULDER SURGERY  2004    right shoulder       Current Outpatient Prescriptions   Medication Sig    atorvastatin (LIPITOR) 80 MG tablet Take 40 mg by mouth every evening.     BED UNDERPADS MISC by Misc.(Non-Drug; Combo Route) route.    busPIRone (BUSPAR) 10 MG tablet Take 20 mg by mouth 3 (three) times daily.     clotrimazole (LOTRIMIN) 1 % Soln Apply 1 application topically 2 (two) times daily.     DIAPER,BRIEF,ADULT, DISPOSABLE (DEPEND UNDERWEAR FOR MEN L-XL MISC) by Misc.(Non-Drug; Combo Route) route.    DULoxetine (CYMBALTA) 20 MG capsule Take 20 mg by mouth 2 (two) times daily.    furosemide (LASIX) 20 MG tablet Take 1 tablet (20 mg total) by mouth once daily.    gabapentin (NEURONTIN) 400 MG capsule Take 400 mg by mouth 3 (three) times daily.    INCONTINENCE PAD,LINER,DISP (POISE PADS EXTRA PLUS MISC) by Misc.(Non-Drug; Combo Route) route.    ketoconazole (NIZORAL) 2 % shampoo Apply 1 application topically twice a week.     methocarbamol (ROBAXIN) 750 MG Tab Take 500 mg by mouth 3 (three) times daily.    naproxen  (NAPROSYN) 500 MG tablet Take 500 mg by mouth 2 (two) times daily with meals.    ondansetron (ZOFRAN-ODT) 4 MG TbDL Take 2 tablets (8 mg total) by mouth every 6 (six) hours as needed.    oxybutynin (DITROPAN) 5 MG Tab Take 5 mg by mouth 3 (three) times daily.    pantoprazole (PROTONIX) 40 MG tablet Take 40 mg by mouth once daily.    urea (CARMOL) 10 % cream Apply 1 application topically as needed.     VITAMIN B COMPLEX ORAL Take 1 tablet by mouth Daily.      No current facility-administered medications for this visit.        Review of patient's allergies indicates:  No Known Allergies    History reviewed. No pertinent family history.    Social History     Social History    Marital status:      Spouse name: N/A    Number of children: N/A    Years of education: N/A     Occupational History    Not on file.     Social History Main Topics    Smoking status: Former Smoker     Packs/day: 0.50     Years: 10.00     Types: Cigarettes    Smokeless tobacco: Never Used    Alcohol use Yes      Comment: rare socially     Drug use: No    Sexual activity: Not on file     Other Topics Concern    Not on file     Social History Narrative    No narrative on file       History of present illness: 6 weeks follow-up right rotator cuff repair. He has been stretching but no therapy yet.  Review of Systems:    Musculoskeletal:  See HPI      Objective:        Physical Examination:    Vital Signs:    Vitals:    06/13/18 1336   BP: (!) 156/87   Pulse: 99       Body mass index is 35.58 kg/m².    This a well-developed, well nourished patient in no acute distress.  They are alert and oriented and cooperative to examination.        Physical exam right shoulder shows a nice smooth motion nice moves arc for flexion passively to about 120. Abducts close to 90. Without pain.  Pertinent New Results:    XRAY Report / Interpretation:       Assessment/Plan:      Plan continue home stretching add physical therapy. Return in 6 weeks  with an AP x-ray the shoulder. No medicines or prior today    This note was created using Dragon voice recognition software that occasionally misinterpreted phrases or words.

## 2018-06-27 ENCOUNTER — TELEPHONE (OUTPATIENT)
Dept: ORTHOPEDICS | Facility: CLINIC | Age: 68
End: 2018-06-27

## 2018-06-27 NOTE — TELEPHONE ENCOUNTER
----- Message from Katheryn Be sent at 6/27/2018  8:15 AM CDT -----  edmond from [facility] p/t called she received the auth but not the orders [phone] opt2 fax [phone]  Per Lakisha varelareghansa!

## 2018-09-24 ENCOUNTER — OFFICE VISIT (OUTPATIENT)
Dept: ORTHOPEDICS | Facility: CLINIC | Age: 68
End: 2018-09-24
Payer: MEDICARE

## 2018-09-24 VITALS
HEART RATE: 80 BPM | WEIGHT: 236 LBS | BODY MASS INDEX: 33.79 KG/M2 | HEIGHT: 70 IN | DIASTOLIC BLOOD PRESSURE: 70 MMHG | SYSTOLIC BLOOD PRESSURE: 141 MMHG

## 2018-09-24 DIAGNOSIS — S46.011D TRAUMATIC COMPLETE TEAR OF RIGHT ROTATOR CUFF, SUBSEQUENT ENCOUNTER: Primary | ICD-10-CM

## 2018-09-24 DIAGNOSIS — S46.011S TRAUMATIC COMPLETE TEAR OF RIGHT ROTATOR CUFF, SEQUELA: ICD-10-CM

## 2018-09-24 PROCEDURE — 20610 DRAIN/INJ JOINT/BURSA W/O US: CPT | Mod: RT,,, | Performed by: ORTHOPAEDIC SURGERY

## 2018-09-24 PROCEDURE — 99024 POSTOP FOLLOW-UP VISIT: CPT | Mod: 25,,, | Performed by: ORTHOPAEDIC SURGERY

## 2018-09-24 PROCEDURE — 73030 X-RAY EXAM OF SHOULDER: CPT | Mod: RT,,, | Performed by: ORTHOPAEDIC SURGERY

## 2018-09-24 RX ORDER — METHYLPREDNISOLONE ACETATE 40 MG/ML
40 INJECTION, SUSPENSION INTRA-ARTICULAR; INTRALESIONAL; INTRAMUSCULAR; SOFT TISSUE
Status: DISCONTINUED | OUTPATIENT
Start: 2018-09-24 | End: 2018-09-24 | Stop reason: HOSPADM

## 2018-09-24 RX ADMIN — METHYLPREDNISOLONE ACETATE 40 MG: 40 INJECTION, SUSPENSION INTRA-ARTICULAR; INTRALESIONAL; INTRAMUSCULAR; SOFT TISSUE at 09:09

## 2018-09-24 NOTE — PROCEDURES
Large Joint Aspiration/Injection: R glenohumeral  Date/Time: 9/24/2018 9:39 AM  Performed by: Trell Chase Jr., MD  Authorized by: Trell Chase Jr., MD     Consent Done?:  Yes (Verbal)  Indications:  Pain  Procedure site marked: Yes    Timeout: Prior to procedure the correct patient, procedure, and site was verified      Location:  Shoulder  Site:  R glenohumeral  Needle size:  25 G  Medications:  40 mg methylPREDNISolone acetate 40 mg/mL  Patient tolerance:  Patient tolerated the procedure well with no immediate complications

## 2018-09-24 NOTE — PROGRESS NOTES
[facility]    Subjective:     Chief Complaint:   Chief Complaint   Patient presents with    Right Shoulder - Pain     Right shoulder RCR 05/08/18. State that it bothers him the most in the morning and at night.         Past Medical History:   Diagnosis Date    Anxiety     Arthritis     Cancer 2005    Prostate    Depression     Encounter for blood transfusion     Hyperlipemia     Neuropathy     Obstructive sleep apnea on CPAP     PTSD (post-traumatic stress disorder)        Past Surgical History:   Procedure Laterality Date    ARTHROSCOPY-SHOULDER WITH SUBACROMIAL DECOMPRESSION  5/8/2018    [facility]    BACK SURGERY      burnt nerves      DEBRIDEMENT-KNEE Right 2/6/2018    [facility]    ELBOW SURGERY  2010, 2008    left, right    EXPLORATION-WOUND Right 2/6/2018    [facility]    GALLBLADDER SURGERY      JOINT REPLACEMENT  2007, 2012    Bilateral knees    KNEE SURGERY  2001, 2007    left and right knee    LYSIS-ADHESION Right 2/6/2018    [facility]    Neck surgery with plate      Crushed C2-3-4    prostate cancer      REPAIR ROTATOR CUFF ARTHROSCOPIC Right 5/8/2018    [facility]    SHOULDER SURGERY  2004    right shoulder       Current Outpatient Medications   Medication Sig    atorvastatin (LIPITOR) 80 MG tablet Take 40 mg by mouth every evening.     busPIRone (BUSPAR) 10 MG tablet Take 20 mg by mouth 3 (three) times daily.     clotrimazole (LOTRIMIN) 1 % Soln Apply 1 application topically 2 (two) times daily.     DULoxetine (CYMBALTA) 20 MG capsule Take 20 mg by mouth 2 (two) times daily.    furosemide (LASIX) 20 MG tablet Take 1 tablet (20 mg total) by mouth once daily.    gabapentin (NEURONTIN) 400 MG capsule Take 400 mg by mouth 3 (three) times daily.    ketoconazole (NIZORAL) 2 % shampoo  Apply 1 application topically twice a week.     methocarbamol (ROBAXIN) 750 MG Tab Take 500 mg by mouth 3 (three) times daily.    naproxen (NAPROSYN) 500 MG tablet Take 500 mg by mouth 2 (two) times daily with meals.    ondansetron (ZOFRAN-ODT) 4 MG TbDL Take 2 tablets (8 mg total) by mouth every 6 (six) hours as needed.    oxybutynin (DITROPAN) 5 MG Tab Take 5 mg by mouth 3 (three) times daily.    pantoprazole (PROTONIX) 40 MG tablet Take 40 mg by mouth once daily.    urea (CARMOL) 10 % cream Apply 1 application topically as needed.     VITAMIN B COMPLEX ORAL Take 1 tablet by mouth Daily.      No current facility-administered medications for this visit.        Review of patient's allergies indicates:  No Known Allergies    History reviewed. No pertinent family history.    Social History     Socioeconomic History    Marital status:      Spouse name: Not on file    Number of children: Not on file    Years of education: Not on file    Highest education level: Not on file   Social Needs    Financial resource strain: Not on file    Food insecurity - worry: Not on file    Food insecurity - inability: Not on file    Transportation needs - medical: Not on file    Transportation needs - non-medical: Not on file   Occupational History    Not on file   Tobacco Use    Smoking status: Former Smoker     Packs/day: 0.50     Years: 10.00     Pack years: 5.00     Types: Cigarettes    Smokeless tobacco: Never Used   Substance and Sexual Activity    Alcohol use: Yes     Comment: rare socially     Drug use: No    Sexual activity: Not on file   Other Topics Concern    Not on file   Social History Narrative    Not on file       History of present illness: Follow-up on right shoulder. He had a massive rotator cuff repair back in May. Said he was doing pretty well. Then about a month ago he's pushing up he felt something give a little bit started having some pain in his right shoulder. Not a big injury but  he was pushing up from seeding.      Review of Systems:    Constitution: Negative for chills, fever, and sweats.  Negative for unexplained weight loss.    HENT:  Negative for headaches and blurry vision.    Cardiovascular:Negative for chest pain or irregular heart beat. Negative for hypertension.    Respiratory:  Negative for cough and shortness of breath.    Gastrointestinal: Negative for abdominal pain, heartburn, melena, nausea, and vomitting.    Genitourinary:  Negative bladder incontinence and dysuria.    Musculoskeletal:  See HPI for details.     Neurological: Negative for numbness.    Psychiatric/Behavioral: Negative for depression.  The patient is not nervous/anxious.      Endocrine: Negative for polyuria    Hematologic/Lymphatic: Negative for bleeding problem.  Does not bruise/bleed easily.    Skin: Negative for poor would healing and rash    Objective:      Physical Examination:    Vital Signs:    Vitals:    09/24/18 0917   BP: (!) 141/70   Pulse: 80       Body mass index is 33.86 kg/m².    This a well-developed, well nourished patient in no acute distress.  They are alert and oriented and cooperative to examination.        Physical exam right shoulder shows he has mild painful arc. He does have good abduction strength. But there is a little bit of impingement. Overall range of motion pretty reasonable.  Pertinent New Results:    XRAY Report / Interpretation:   AP x-ray right shoulder shows that there are 3 metal anchors buried in the humeral head. There is another metal anchor that is medial to the glenohumeral joint surface. We do not know with his anterior or posterior. Does not appear to be in the joint itself. This is definitely not in the subdeltoid space but lines a more with the middle portion of the glenoid. There is a small ossification in the subdeltoid space. That's about half a centimeter. There is no significant superior migration of the humeral head.Electronically Signed By Trell Chase JR  MD    Assessment/Plan:      So my impression is I'm not sure where the anchor is that is either anterior posterior. I don't think is in the joint on not sure not really worried about that. There is a little slight ossification that is in the supraspinatus region. That may be part of his current pain. Probably however he scattered incompletely healed rotator cuff repair. Our plan is a steroid injection 1 cc Depo-Medrol 5 cc lidocaine and see what happens over the next few months. As the shoulder is still much better than preop. This was a big rotator cuff tear. And if we need to we can do another MRI of his right shoulder.      This note was created using Dragon voice recognition software that occasionally misinterpreted phrases or words.

## 2018-10-29 ENCOUNTER — OFFICE VISIT (OUTPATIENT)
Dept: ORTHOPEDICS | Facility: CLINIC | Age: 68
End: 2018-10-29
Payer: MEDICARE

## 2018-10-29 VITALS — HEART RATE: 70 BPM | SYSTOLIC BLOOD PRESSURE: 136 MMHG | DIASTOLIC BLOOD PRESSURE: 75 MMHG

## 2018-10-29 DIAGNOSIS — S46.011S TRAUMATIC COMPLETE TEAR OF RIGHT ROTATOR CUFF, SEQUELA: Primary | ICD-10-CM

## 2018-10-29 PROCEDURE — 99213 OFFICE O/P EST LOW 20 MIN: CPT | Mod: ,,, | Performed by: ORTHOPAEDIC SURGERY

## 2018-10-29 PROCEDURE — 1101F PT FALLS ASSESS-DOCD LE1/YR: CPT | Mod: ,,, | Performed by: ORTHOPAEDIC SURGERY

## 2018-10-29 RX ORDER — TRAMADOL HYDROCHLORIDE 50 MG/1
50 TABLET ORAL EVERY 6 HOURS PRN
Qty: 30 TABLET | Refills: 0 | Status: SHIPPED | OUTPATIENT
Start: 2018-10-29 | End: 2018-11-08

## 2018-10-29 NOTE — PROGRESS NOTES
[facility]    Subjective:     Chief Complaint:   Chief Complaint   Patient presents with    Right Shoulder - Post-op Evaluation     Rt shoulder Scope done 5/8/2018. Pain worse at night        Past Medical History:   Diagnosis Date    Anxiety     Arthritis     Cancer 2005    Prostate    Depression     Encounter for blood transfusion     Hyperlipemia     Neuropathy     Obstructive sleep apnea on CPAP     PTSD (post-traumatic stress disorder)        Past Surgical History:   Procedure Laterality Date    ARTHROSCOPY-SHOULDER WITH SUBACROMIAL DECOMPRESSION  5/8/2018    Performed by Tre[facility] Jr[facility] at [facility] OR    BACK SURGERY      burnt nerves      DEBRIDEMENT-KNEE Right 2/6/2018    [facility] at [facility] OR    ELBOW SURGERY  2010, 2008    left, right    EXPLORATION-WOUND Right 2/6/2018    [facility] at [facility] OR    GALLBLADDER SURGERY      JOINT REPLACEMENT  2007, 2012    Bilateral knees    KNEE SURGERY  2001, 2007    left and right knee    LYSIS-ADHESION Right 2/6/2018    [facility] by [facility] at [facility] OR    Neck surgery with plate      Crushed C2-3-4    prostate cancer      REPAIR ROTATOR CUFF ARTHROSCOPIC Right 5/8/2018    Per[facility] by [facility] at [facility] OR    SHOULDER SURGERY  2004    right shoulder       Current Outpatient Medications   Medication Sig    atorvastatin (LIPITOR) 80 MG tablet Take 40 mg by mouth every evening.     busPIRone (BUSPAR) 10 MG tablet Take 20 mg by mouth 3 (three) times daily.     clotrimazole (LOTRIMIN) 1 % Soln Apply 1 application topically 2 (two) times daily.     DULoxetine (CYMBALTA) 20 MG capsule Take 20 mg by mouth 2 (two) times daily.    furosemide (LASIX) 20 MG tablet Take 1 tablet (20 mg total) by mouth once daily.    gabapentin (NEURONTIN) 400 MG capsule Take 400 mg by mouth 3 (three) times daily.    ketoconazole (NIZORAL) 2 % shampoo Apply 1 application topically  twice a week.     methocarbamol (ROBAXIN) 750 MG Tab Take 500 mg by mouth 3 (three) times daily.    naproxen (NAPROSYN) 500 MG tablet Take 500 mg by mouth 2 (two) times daily with meals.    ondansetron (ZOFRAN-ODT) 4 MG TbDL Take 2 tablets (8 mg total) by mouth every 6 (six) hours as needed.    oxybutynin (DITROPAN) 5 MG Tab Take 5 mg by mouth 3 (three) times daily.    pantoprazole (PROTONIX) 40 MG tablet Take 40 mg by mouth once daily.    urea (CARMOL) 10 % cream Apply 1 application topically as needed.     VITAMIN B COMPLEX ORAL Take 1 tablet by mouth Daily.      No current facility-administered medications for this visit.        Review of patient's allergies indicates:  No Known Allergies    History reviewed. No pertinent family history.    Social History     Socioeconomic History    Marital status:      Spouse name: Not on file    Number of children: Not on file    Years of education: Not on file    Highest education level: Not on file   Social Needs    Financial resource strain: Not on file    Food insecurity - worry: Not on file    Food insecurity - inability: Not on file    Transportation needs - medical: Not on file    Transportation needs - non-medical: Not on file   Occupational History    Not on file   Tobacco Use    Smoking status: Former Smoker     Packs/day: 0.50     Years: 10.00     Pack years: 5.00     Types: Cigarettes    Smokeless tobacco: Never Used   Substance and Sexual Activity    Alcohol use: Yes     Comment: rare socially     Drug use: No    Sexual activity: Not on file   Other Topics Concern    Not on file   Social History Narrative    Not on file       History of present illness follow-up right shoulder. He had day of rotator cuff repair also then had injection of month ago. He doing well with his right shoulder does point. He has good range of motion with very little pain in the shoulder and reasonable strength as well.  Review of Systems:    Constitution:  Negative for chills, fever, and sweats.  Negative for unexplained weight loss.    HENT:  Negative for headaches and blurry vision.    Cardiovascular:Negative for chest pain or irregular heart beat. Negative for hypertension.    Respiratory:  Negative for cough and shortness of breath.    Gastrointestinal: Negative for abdominal pain, heartburn, melena, nausea, and vomitting.    Genitourinary:  Negative bladder incontinence and dysuria.    Musculoskeletal:  See HPI for details.     Neurological: Negative for numbness.    Psychiatric/Behavioral: Negative for depression.  The patient is not nervous/anxious.      Endocrine: Negative for polyuria    Hematologic/Lymphatic: Negative for bleeding problem.  Does not bruise/bleed easily.    Skin: Negative for poor would healing and rash    Objective:      Physical Examination:    Vital Signs:    Vitals:    10/29/18 1141   BP: 136/75   Pulse: 70       There is no height or weight on file to calculate BMI.    This a well-developed, well nourished patient in no acute distress.  They are alert and oriented and cooperative to examination.        The right shoulder shows nearly full forward flexion but not quite nearly full abduction but not quite he has adequate abduction power does not have a painful arc or crepitation in shoulder and has nearly normal rotation.  Pertinent New Results:    XRAY Report / Interpretation:       Assessment/Plan:      So our plan is I think he's finished his therapy I think he has a good result from his shoulder and now may not be a full complete rotator cuff repair but certainly very functional and we will see him back when necessary      This note was created using Dragon voice recognition software that occasionally misinterpreted phrases or words.

## 2019-02-27 ENCOUNTER — OFFICE VISIT (OUTPATIENT)
Dept: RHEUMATOLOGY | Facility: CLINIC | Age: 69
End: 2019-02-27
Payer: MEDICARE

## 2019-02-27 VITALS
SYSTOLIC BLOOD PRESSURE: 122 MMHG | BODY MASS INDEX: 35.25 KG/M2 | WEIGHT: 245.69 LBS | DIASTOLIC BLOOD PRESSURE: 75 MMHG

## 2019-02-27 DIAGNOSIS — M19.042 OSTEOARTHRITIS OF FINGERS OF BOTH HANDS: Primary | ICD-10-CM

## 2019-02-27 DIAGNOSIS — M19.041 OSTEOARTHRITIS OF FINGERS OF BOTH HANDS: Primary | ICD-10-CM

## 2019-02-27 PROCEDURE — 1101F PR PT FALLS ASSESS DOC 0-1 FALLS W/OUT INJ PAST YR: ICD-10-PCS | Mod: ,,, | Performed by: INTERNAL MEDICINE

## 2019-02-27 PROCEDURE — 1101F PT FALLS ASSESS-DOCD LE1/YR: CPT | Mod: ,,, | Performed by: INTERNAL MEDICINE

## 2019-02-27 PROCEDURE — 99203 OFFICE O/P NEW LOW 30 MIN: CPT | Mod: ,,, | Performed by: INTERNAL MEDICINE

## 2019-02-27 PROCEDURE — 99203 PR OFFICE/OUTPT VISIT, NEW, LEVL III, 30-44 MIN: ICD-10-PCS | Mod: ,,, | Performed by: INTERNAL MEDICINE

## 2019-02-27 RX ORDER — HYDROXYZINE HYDROCHLORIDE 10 MG/1
25 TABLET, FILM COATED ORAL 3 TIMES DAILY PRN
COMMUNITY
End: 2021-11-11

## 2019-02-27 RX ORDER — AMOXICILLIN 500 MG/1
500 CAPSULE ORAL
COMMUNITY
End: 2020-10-23 | Stop reason: ALTCHOICE

## 2019-02-27 NOTE — PROGRESS NOTES
[facility] RHEUMATOLOGY           New patient visit    Notes dictated via Dragon to EPIC. Please forgive any unintentional errors.  Subjective:       Patient ID:   NAME: Jorgito Mora III : 1950     68 y.o. male    Referring Doc: No ref. provider found  Other Physicians:    Chief Complaint:  Arthritis evaluation        HPI:  This patient presents with joint stiffness and deformity in both hands. He states this has been going on for quite a number of years and was exacerbated by a severe fall that did damage to the neck, shoulder, and hand on the right side. He has for the last several years been noticing increasing deformity of the right hand though he has not seen any red, hot, and/or swollen joints. Additionally, he does not have much pain. He is deformity does not, for the most part, interfere with his chores or ADLs. He takes Naprosyn as needed when some activity pain occurs though this has no impact on the problem with the right hand.    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      ROS:   GEN:      no fever, night sweats or weight loss  SKIN:     no rashes , erythema, bruising, or swelling, no Raynauds, no photosensitivity  HEENT:  no acute changes in vision, no mouth ulcers, no sicca symptoms, no scalp tenderness, jaw claudication.  CV:        no CP, PND, TEJADA or orthopnea, no palpitations  PULM:   no SOB, cough, hemoptysis, sputum or pleuritic pain  GI:          No dysphagia or GERD, no abdominal pain, nausea, vomiting, constipation, diarrhea, melanotic stools, BRBPR, or hematemesis.  :        no hematuria, dysuria  NEURO: no paresthesias, headaches, visual disturbances, muscle weakness  MUSCULOSKELETAL:  Joint stiffness and deformity without any actively inflamed joints  PSYCH:  + insomnia, anxiety-depression    Medications:    Current  Outpatient Medications:     amoxicillin (AMOXIL) 500 MG capsule, Take 500 mg by mouth as needed (4 tablets before dental appt)., Disp: , Rfl:     atorvastatin (LIPITOR) 80 MG tablet, Take 40 mg by mouth every evening. , Disp: , Rfl:     clotrimazole (LOTRIMIN) 1 % Soln, Apply 1 application topically 2 (two) times daily. , Disp: , Rfl:     DULoxetine (CYMBALTA) 20 MG capsule, Take 20 mg by mouth 2 (two) times daily., Disp: , Rfl:     furosemide (LASIX) 20 MG tablet, Take 1 tablet (20 mg total) by mouth once daily., Disp: 10 tablet, Rfl: 0    gabapentin (NEURONTIN) 400 MG capsule, Take 400 mg by mouth 3 (three) times daily., Disp: , Rfl:     hydrOXYzine HCl (ATARAX) 10 MG Tab, Take 25 mg by mouth 3 (three) times daily as needed., Disp: , Rfl:     ketoconazole (NIZORAL) 2 % shampoo, Apply 1 application topically twice a week. , Disp: , Rfl:     lanolin/mineral oil/petrolatum (ARTIFICIAL TEARS OPHT), Apply to eye., Disp: , Rfl:     methocarbamol (ROBAXIN) 750 MG Tab, Take 500 mg by mouth 3 (three) times daily., Disp: , Rfl:     naproxen (NAPROSYN) 500 MG tablet, Take 500 mg by mouth 2 (two) times daily with meals., Disp: , Rfl:     oxybutynin (DITROPAN) 5 MG Tab, Take 5 mg by mouth 3 (three) times daily., Disp: , Rfl:     pantoprazole (PROTONIX) 40 MG tablet, Take 40 mg by mouth once daily., Disp: , Rfl:     urea (CARMOL) 10 % cream, Apply 1 application topically as needed. , Disp: , Rfl:     VITAMIN B COMPLEX ORAL, Take 1 tablet by mouth Daily. , Disp: , Rfl:     FAMILY HISTORY: negative for Connective Tissue Disease        Review of patient's allergies indicates:  No Known Allergies          Objective:     Vitals:  Blood pressure 122/75, weight 111.4 kg (245 lb 11.2 oz).    Physical Examination:   GEN:     wn/wd male in no apparent distress  SKIN:    no rashes, no sclerodactyly, no Raynaud's, no periungual erythema, no digital tip tapering, no nailbed pitting  HEAD:   no alopecia, no scalp tenderness,  no temporal artery tenderness or induration.  EYES:   no pallor, no icterus, PERRLA  ENT:     no thrush, no mucosal dryness or ulcerations, adequate oral hygiene & dentition.  NECK:  supple x 6, no masses, no thyromegaly, no lymphadenopathy.  CV:        S1 and S2 regular, no murmurs, gallop or rubs  CHEST: Normal respiratory effort;  normal breath sounds/no adventitious sounds. No signs of consolidation.  ABD:      non-tender and non-distended; soft; normal bowel sounds; no rebound, guarding, or tenderness. No hepatosplenomegaly.  Musculoskeletal:  There is a 10° extension lag involving the right elbow without any evidence of inflammatory disease. The shoulder and wrist however are unaffected. There is no interosseous atrophy noted. There is early evidence of some subluxation of the right fifth finger. MCP joints are preserved with good range of motion. There are Heberden's and Houston's nodes present bilaterally with decreased range of motion of the DIP joints to approximately 30°. Much milder changes are noted in the left hand with only some flexion loss of the left third MCP.  strength bilaterally is surprisingly well preserved though certainly affected.  EXTREM: no clubbing, cyanosis or edema. normal pulses.  NEURO:  grossly intact; motor/sensory WNL; no tremors  PSYCH:  normal mood, affect and behavior            Labs:   Lab Results   Component Value Date    WBC 6.10 04/30/2018    HGB 14.0 04/30/2018    HCT 42.3 04/30/2018    MCV 85 04/30/2018     04/30/2018   CMP@  Sodium   Date Value Ref Range Status   04/30/2018 140 136 - 145 mmol/L Final     Potassium   Date Value Ref Range Status   04/30/2018 4.1 3.5 - 5.1 mmol/L Final     Chloride   Date Value Ref Range Status   04/30/2018 106 95 - 110 mmol/L Final     CO2   Date Value Ref Range Status   04/30/2018 26 23 - 29 mmol/L Final     Glucose   Date Value Ref Range Status   04/30/2018 100 70 - 110 mg/dL Final     BUN, Bld   Date Value Ref Range Status    04/30/2018 19 8 - 23 mg/dL Final     Creatinine   Date Value Ref Range Status   04/30/2018 0.8 0.5 - 1.4 mg/dL Final     Calcium   Date Value Ref Range Status   04/30/2018 9.7 8.7 - 10.5 mg/dL Final     Total Protein   Date Value Ref Range Status   05/28/2010 7.0 6.0 - 8.4 gm/dl Final     Albumin   Date Value Ref Range Status   05/28/2010 3.6 3.5 - 5.2 g/dl Final     Total Bilirubin   Date Value Ref Range Status   05/28/2010 0.5 0.1 - 1.0 mg/dl Final     Comment:     For infants and newborns, interpretation of results should be based  on gestational age, weight and in agreement with clinical  observations.  .  Premature Infant recommended reference ranges:  Up to 24 hours.............<8.0 mg/dl  Up to 48 hours............<12.0 mg/dl  3-5 days..................<15.0 mg/dl  6-29 days.................<15.0 mg/dl       Alkaline Phosphatase   Date Value Ref Range Status   05/28/2010 70 55 - 135 U/L Final     AST   Date Value Ref Range Status   05/28/2010 20 10 - 40 U/L Final     ALT   Date Value Ref Range Status   05/28/2010 24 10 - 44 U/L Final     CPK   Date Value Ref Range Status   06/01/2009 179 20 - 200 U/L Final     CRP   Date Value Ref Range Status   08/30/2017 0.33 0.00 - 1.40 mg/dL          Radiology/Diagnostic Studies:    none    Assessment/Discussion/Plan:   68 y.o. male with osteoarthritis-generalized      PLAN:  The problem now is damage already existing in the right upper extremity from both osteoarthritis and neuropathy. I do not think any medication is going to be helpful enough to warrant a new set of potential interactions and side effects. Rather, I think that home exercise therapy will be more helpful. We have instructed him on the use of a paraffin wax. I have also mentioned that he should obtain some Play-Salomón for use immediately after a daily wax bath. Lastly, I have suggested that he consider seeing Dr. Nic Encarnacion, hand surgery, for an opinion on whether any of these deformities can be  corrected.  I see no need for blood testing or x-rays at present.    RTC:  I will see him back in 4 months and then again on an as-needed basis      Electronically signed by Salbador Hall MD

## 2019-05-13 ENCOUNTER — OFFICE VISIT (OUTPATIENT)
Dept: ORTHOPEDICS | Facility: CLINIC | Age: 69
End: 2019-05-13
Payer: MEDICARE

## 2019-05-13 VITALS
BODY MASS INDEX: 35.79 KG/M2 | HEART RATE: 96 BPM | SYSTOLIC BLOOD PRESSURE: 158 MMHG | DIASTOLIC BLOOD PRESSURE: 80 MMHG | WEIGHT: 250 LBS | HEIGHT: 70 IN

## 2019-05-13 DIAGNOSIS — S76.312A TEAR OF LEFT HAMSTRING, INITIAL ENCOUNTER: ICD-10-CM

## 2019-05-13 DIAGNOSIS — S80.12XA HEMATOMA OF LEFT LOWER EXTREMITY, INITIAL ENCOUNTER: ICD-10-CM

## 2019-05-13 DIAGNOSIS — M79.605 PAIN OF LEFT LOWER EXTREMITY: Primary | ICD-10-CM

## 2019-05-13 DIAGNOSIS — M25.562 ACUTE PAIN OF LEFT KNEE: ICD-10-CM

## 2019-05-13 PROCEDURE — 1101F PT FALLS ASSESS-DOCD LE1/YR: CPT | Mod: ,,, | Performed by: ORTHOPAEDIC SURGERY

## 2019-05-13 PROCEDURE — 73562 X-RAY EXAM OF KNEE 3: CPT | Mod: LT,,, | Performed by: ORTHOPAEDIC SURGERY

## 2019-05-13 PROCEDURE — 99213 PR OFFICE/OUTPT VISIT, EST, LEVL III, 20-29 MIN: ICD-10-PCS | Mod: ,,, | Performed by: ORTHOPAEDIC SURGERY

## 2019-05-13 PROCEDURE — 1101F PR PT FALLS ASSESS DOC 0-1 FALLS W/OUT INJ PAST YR: ICD-10-PCS | Mod: ,,, | Performed by: ORTHOPAEDIC SURGERY

## 2019-05-13 PROCEDURE — 99213 OFFICE O/P EST LOW 20 MIN: CPT | Mod: ,,, | Performed by: ORTHOPAEDIC SURGERY

## 2019-05-13 PROCEDURE — 73562 PR  X-RAY KNEE 3 VIEW: ICD-10-PCS | Mod: LT,,, | Performed by: ORTHOPAEDIC SURGERY

## 2019-05-13 NOTE — PROGRESS NOTES
[facility]    Subjective:     Chief Complaint:   Chief Complaint   Patient presents with    Left Knee - Pain     Left knee pain/ swelling x 1 month       Past Medical History:   Diagnosis Date    Anxiety     Arthritis     Cancer 2005    Prostate    Depression     Encounter for blood transfusion     Hyperlipemia     Neuropathy     Obstructive sleep apnea on CPAP     PTSD (post-traumatic stress disorder)        Past Surgical History:   Procedure Laterality Date    ARTHROSCOPY-SHOULDER WITH SUBACROMIAL DECOMPRESSION  5/8/2018    [facility]    BACK SURGERY      burnt nerves      DEBRIDEMENT-KNEE Right 2/6/2018    [facility]    ELBOW SURGERY  2010, 2008    left, right    EXPLORATION-WOUND Right 2/6/2018    [facility]    GALLBLADDER SURGERY      JOINT REPLACEMENT  2007, 2012    Bilateral knees    KNEE SURGERY  2001, 2007    left and right knee    LYSIS-ADHESION Right 2/6/2018    [facility]    Neck surgery with plate      Crushed C2-3-4    prostate cancer      REPAIR ROTATOR CUFF ARTHROSCOPIC Right 5/8/2018    [facility]    SHOULDER SURGERY  2004    right shoulder       Current Outpatient Medications   Medication Sig    amoxicillin (AMOXIL) 500 MG capsule Take 500 mg by mouth as needed (4 tablets before dental appt).    atorvastatin (LIPITOR) 80 MG tablet Take 40 mg by mouth every evening.     clotrimazole (LOTRIMIN) 1 % Soln Apply 1 application topically 2 (two) times daily.     DULoxetine (CYMBALTA) 20 MG capsule Take 20 mg by mouth 2 (two) times daily.    furosemide (LASIX) 20 MG tablet Take 1 tablet (20 mg total) by mouth once daily.    gabapentin (NEURONTIN) 400 MG capsule Take 400 mg by mouth 3 (three) times daily.    hydrOXYzine HCl (ATARAX) 10 MG Tab Take 25 mg by mouth 3 (three) times daily as  needed.    ketoconazole (NIZORAL) 2 % shampoo Apply 1 application topically twice a week.     lanolin/mineral oil/petrolatum (ARTIFICIAL TEARS OPHT) Apply to eye.    methocarbamol (ROBAXIN) 750 MG Tab Take 500 mg by mouth 3 (three) times daily.    naproxen (NAPROSYN) 500 MG tablet Take 500 mg by mouth 2 (two) times daily with meals.    oxybutynin (DITROPAN) 5 MG Tab Take 5 mg by mouth 3 (three) times daily.    pantoprazole (PROTONIX) 40 MG tablet Take 40 mg by mouth once daily.    urea (CARMOL) 10 % cream Apply 1 application topically as needed.     VITAMIN B COMPLEX ORAL Take 1 tablet by mouth Daily.      No current facility-administered medications for this visit.        Review of patient's allergies indicates:  No Known Allergies    History reviewed. No pertinent family history.    Social History     Socioeconomic History    Marital status:      Spouse name: Not on file    Number of children: Not on file    Years of education: Not on file    Highest education level: Not on file   Occupational History    Not on file   Social Needs    Financial resource strain: Not on file    Food insecurity:     Worry: Not on file     Inability: Not on file    Transportation needs:     Medical: Not on file     Non-medical: Not on file   Tobacco Use    Smoking status: Former Smoker     Packs/day: 0.50     Years: 10.00     Pack years: 5.00     Types: Cigarettes    Smokeless tobacco: Never Used   Substance and Sexual Activity    Alcohol use: Yes     Comment: rare socially     Drug use: No    Sexual activity: Not on file   Lifestyle    Physical activity:     Days per week: Not on file     Minutes per session: Not on file    Stress: Not on file   Relationships    Social connections:     Talks on phone: Not on file     Gets together: Not on file     Attends Worship service: Not on file     Active member of club or organization: Not on file     Attends meetings of clubs or organizations: Not on file      Relationship status: Not on file   Other Topics Concern    Not on file   Social History Narrative    Not on file       History of present illness: Kristopher current issue is left leg. He had an episode around Easter which would be 3 weeks ago he was stretching currently has hamstring spasms and hamstring tightness from old neurological issues the left leg he felt a searing pain along the medial hamstrings. With some additional pain. No big injury at all. Then days later he noticed some swelling and then eventually some ecchymosis in the left leg. Posterior thigh medial thigh. He walks with a cane anyway but he's had some increasing pain in the left thigh because of this injury.  Review of Systems:    Constitution: Negative for chills, fever, and sweats.  Negative for unexplained weight loss.    HENT:  Negative for headaches and blurry vision.    Cardiovascular:Negative for chest pain or irregular heart beat. Negative for hypertension.    Respiratory:  Negative for cough and shortness of breath.    Gastrointestinal: Negative for abdominal pain, heartburn, melena, nausea, and vomitting.    Genitourinary:  Negative bladder incontinence and dysuria.    Musculoskeletal:  See HPI for details.     Neurological: Negative for numbness.    Psychiatric/Behavioral: Negative for depression.  The patient is not nervous/anxious.      Endocrine: Negative for polyuria    Hematologic/Lymphatic: Negative for bleeding problem.  Does not bruise/bleed easily.    Skin: Negative for poor would healing and rash    Objective:      Physical Examination:    Vital Signs:    Vitals:    05/13/19 1602   BP: (!) 160/80   Pulse: 96       Body mass index is 35.87 kg/m².    This a well-developed, well nourished patient in no acute distress.  They are alert and oriented and cooperative to examination.        So physical exam shows he scat significant ecchymosis posteriorly in the left thigh involving the majority of the thigh. He is mildly tender over the  mid hamstrings. I cannot palpate a big defect in the hamstrings and specifically not at the the ischium. There is no big defect palpable. He has a total knee on the left. It seems to be nontender and has some stiffness but nothing new. Very impressive ecchymosis around the hamstrings medially and posteriorly in the left he can walk with a cane but that's how usually walks  Pertinent New Results:    XRAY Report / Interpretation:   AP lateral sunrise left knee shows a total knee arthroplasty. No evidence for loosening. Or wear. The distal femur has no evidence for a fracture. I believe the patella is not resurfaced. There appears to be a small ossicle lateral to the patella on sunrise view.nO acute findings.Electronically Signed By Trell Chase JR, MD    Assessment/Plan:      's my impression is he has a hamstring injury to the left leg. He's very disabled from any things. He has tight hamstrings is start with. Our plan is a would do ultrasound to see if there is any major defects or hematomas in the left leg that would need addressing but I don't think so. He needs a Ace wrap to help the quad. This is going to take 2 or 3 months to resolve. He has a cane. Does not require pain medicines. Not on any anticoagulants.      This note was created using Dragon voice recognition software that occasionally misinterpreted phrases or words.

## 2019-05-22 ENCOUNTER — TELEPHONE (OUTPATIENT)
Dept: ORTHOPEDICS | Facility: CLINIC | Age: 69
End: 2019-05-22

## 2020-02-12 PROBLEM — Z85.46 HISTORY OF PROSTATE CANCER: Status: ACTIVE | Noted: 2020-02-12

## 2020-02-12 PROBLEM — M15.0 PRIMARY OSTEOARTHRITIS INVOLVING MULTIPLE JOINTS: Status: ACTIVE | Noted: 2020-02-12

## 2020-02-12 PROBLEM — Z87.891 FORMER SMOKER, STOPPED SMOKING IN DISTANT PAST: Status: ACTIVE | Noted: 2020-02-12

## 2020-02-12 PROBLEM — M15.9 PRIMARY OSTEOARTHRITIS INVOLVING MULTIPLE JOINTS: Status: ACTIVE | Noted: 2020-02-12

## 2020-02-12 PROBLEM — F41.9 ANXIETY AND DEPRESSION: Status: ACTIVE | Noted: 2018-02-06

## 2020-02-13 ENCOUNTER — OFFICE VISIT (OUTPATIENT)
Dept: FAMILY MEDICINE | Facility: CLINIC | Age: 70
End: 2020-02-13
Payer: MEDICARE

## 2020-02-13 VITALS
HEART RATE: 90 BPM | TEMPERATURE: 99 F | OXYGEN SATURATION: 95 % | SYSTOLIC BLOOD PRESSURE: 136 MMHG | BODY MASS INDEX: 34.72 KG/M2 | HEIGHT: 70 IN | RESPIRATION RATE: 18 BRPM | DIASTOLIC BLOOD PRESSURE: 82 MMHG | WEIGHT: 242.5 LBS

## 2020-02-13 DIAGNOSIS — R51.9 RECURRENT OCCIPITAL HEADACHE: ICD-10-CM

## 2020-02-13 DIAGNOSIS — M15.9 PRIMARY OSTEOARTHRITIS INVOLVING MULTIPLE JOINTS: Primary | ICD-10-CM

## 2020-02-13 PROBLEM — R73.03 PREDIABETES: Status: ACTIVE | Noted: 2020-02-13

## 2020-02-13 PROBLEM — R60.0 BILATERAL LOWER EXTREMITY EDEMA: Status: ACTIVE | Noted: 2020-02-13

## 2020-02-13 PROCEDURE — 1126F AMNT PAIN NOTED NONE PRSNT: CPT | Mod: S$GLB,,, | Performed by: FAMILY MEDICINE

## 2020-02-13 PROCEDURE — 1101F PT FALLS ASSESS-DOCD LE1/YR: CPT | Mod: CPTII,S$GLB,, | Performed by: FAMILY MEDICINE

## 2020-02-13 PROCEDURE — 99999 PR PBB SHADOW E&M-EST. PATIENT-LVL IV: ICD-10-PCS | Mod: PBBFAC,,, | Performed by: FAMILY MEDICINE

## 2020-02-13 PROCEDURE — 99203 PR OFFICE/OUTPT VISIT, NEW, LEVL III, 30-44 MIN: ICD-10-PCS | Mod: S$GLB,,, | Performed by: FAMILY MEDICINE

## 2020-02-13 PROCEDURE — 99999 PR PBB SHADOW E&M-EST. PATIENT-LVL IV: CPT | Mod: PBBFAC,,, | Performed by: FAMILY MEDICINE

## 2020-02-13 PROCEDURE — 1159F MED LIST DOCD IN RCRD: CPT | Mod: S$GLB,,, | Performed by: FAMILY MEDICINE

## 2020-02-13 PROCEDURE — 1126F PR PAIN SEVERITY QUANTIFIED, NO PAIN PRESENT: ICD-10-PCS | Mod: S$GLB,,, | Performed by: FAMILY MEDICINE

## 2020-02-13 PROCEDURE — 1159F PR MEDICATION LIST DOCUMENTED IN MEDICAL RECORD: ICD-10-PCS | Mod: S$GLB,,, | Performed by: FAMILY MEDICINE

## 2020-02-13 PROCEDURE — 1101F PR PT FALLS ASSESS DOC 0-1 FALLS W/OUT INJ PAST YR: ICD-10-PCS | Mod: CPTII,S$GLB,, | Performed by: FAMILY MEDICINE

## 2020-02-13 PROCEDURE — 99203 OFFICE O/P NEW LOW 30 MIN: CPT | Mod: S$GLB,,, | Performed by: FAMILY MEDICINE

## 2020-02-13 RX ORDER — POLYVINYL ALCOHOL 14 MG/ML
1 SOLUTION/ DROPS OPHTHALMIC
COMMUNITY

## 2020-02-13 RX ORDER — BUSPIRONE HYDROCHLORIDE 10 MG/1
10 TABLET ORAL 3 TIMES DAILY
COMMUNITY

## 2020-02-13 RX ORDER — DESONIDE 0.5 MG/G
CREAM TOPICAL 2 TIMES DAILY
COMMUNITY

## 2020-02-13 RX ORDER — AMMONIUM LACTATE 12 G/100G
CREAM TOPICAL
COMMUNITY

## 2020-02-13 NOTE — PROGRESS NOTES
Subjective:       Patient ID: Jorgito Mora III is a 69 y.o. male.    Chief Complaint: Establish Care    HPI   Patient presents with his wife scheduled to initially states here to establish care with a new family doctor but later visit tells me he is just here secondary to [facility] for his occipital headaches that have been present sporadically for about the past 6-7 months.  He was advised that these were secondary to neck pain and tests muscles causing tension headache and he admits use of heat/ice, deep topical muscle rub, massage and stretching exercises improves pains and if these are bad enough use of and/or naproxen resolves them.  The pains are tight, constant, dull and achy when present and do not radiate.  These are sometimes across the entire occipital area but notably worse along the right side.  He has limitations and active range of motion chronically secondary to he cervical fusion but when headaches are present he has noticed even further decreased range of motion generally with rotational limitations left greater than right.  Headaches are occurring a few times weekly and are mild to moderate in intensity lasting generally few hours before resolving.  He has not had any associated symptoms including any type of neurologic deficit with exception of some garbled left-sided hearing that occurred a few months ago although [facility] and was advised he had fluid behind the left eardrum and was prescribed Flonase for this.  He also follows with Dermatology, Gastroenterology and Urology through [facility] and follows with Orthopedics within our system and Cardiology (Dr. Carrillo) just for cardiac screening every few years since he had some musculoskeletal chest pains remotely.  He tells me he had normal testing in late 2018 or early 2019 consisting of EKG, echocardiogram and nuclear stress test.  He has a past medical history as in the  problem list and advised me he was up-to-date with all [facility] issues [facility] including a normal colonoscopy 2 years ago, flu shot last, all pneumonia vaccines within the past few years, tetanus vaccination within the past 5 years, shingles vaccination within the past 2 years, annual PSA testing normal screening labs including lipid panel completed last in July of 2019 and abdominal aortic aneurysm screening 3-4 years ago.  He has no other concerns or complaints today and tells me with exception of arthritic joint pains, muscle aches and headaches he has been doing fine.    Review of Systems   Constitutional: Negative for activity change, appetite change, fatigue and unexpected weight change.   HENT: Positive for hearing loss (Left hearing aid use.). Negative for congestion, sore throat, trouble swallowing and voice change.    Eyes: Negative for visual disturbance.   Respiratory: Negative for cough, chest tightness, shortness of breath and wheezing.    Cardiovascular: Negative for chest pain, palpitations and leg swelling.   Gastrointestinal: Negative for abdominal pain, blood in stool, constipation, diarrhea, nausea and vomiting.   Genitourinary: Positive for enuresis, frequency and urgency. Negative for decreased urine volume, difficulty urinating, dysuria, flank pain and hematuria.        Wears depends secondary to urge incontinence.   Musculoskeletal: Positive for arthralgias, back pain, gait problem (Ambulates with a single-point cane.), joint swelling, myalgias and neck pain. Negative for neck stiffness.   Skin: Negative for rash and wound.   Neurological: Positive for numbness (Infrequent right-sided lower extremity sciatica symptoms.) and headaches (No current head pain.). Negative for dizziness, tremors, syncope, weakness and light-headedness.   Hematological: Negative for adenopathy. Does not bruise/bleed easily.   Psychiatric/Behavioral: Negative for dysphoric mood and sleep  "disturbance. The patient is not nervous/anxious.          Objective:      Vitals:    02/13/20 0901   BP: 136/82   Pulse: 90   Resp: 18   Temp: 98.5 °F (36.9 °C)   TempSrc: Oral   SpO2: 95%   Weight: 110 kg (242 lb 8.1 oz)   Height: 5' 10" (1.778 m)   PainSc: 0-No pain     Physical Exam   Constitutional: He is oriented to person, place, and time. He appears well-developed and well-nourished. No distress.   HENT:   Head: Normocephalic and atraumatic.   Right Ear: Hearing, tympanic membrane, external ear and ear canal normal.   Left Ear: Tympanic membrane, external ear and ear canal normal. Decreased hearing (With removal of hearing aid.) is noted.   Nose: Mucosal edema (Mild) and rhinorrhea (Clear, thin) present. Right sinus exhibits no maxillary sinus tenderness and no frontal sinus tenderness. Left sinus exhibits no maxillary sinus tenderness and no frontal sinus tenderness.   Mouth/Throat: Uvula is midline and mucous membranes are normal. No oral lesions. No trismus in the jaw. No uvula swelling. Oropharyngeal exudate (Scant postnasal drip present.) present. No posterior oropharyngeal edema, posterior oropharyngeal erythema or tonsillar abscesses.   Eyes: Pupils are equal, round, and reactive to light. Conjunctivae and EOM are normal. Right eye exhibits no discharge. Left eye exhibits no discharge. No scleral icterus.   Neck: Trachea normal, normal range of motion and phonation normal. Neck supple. No JVD present. Carotid bruit is not present. No thyroid mass and no thyromegaly present.   Cardiovascular: Normal rate, regular rhythm and normal heart sounds. Exam reveals no gallop and no friction rub.   No murmur heard.  Pulmonary/Chest: Effort normal and breath sounds normal. No respiratory distress. He has no wheezes. He exhibits no tenderness.   Abdominal: Soft. Bowel sounds are normal. He exhibits no distension, no abdominal bruit, no ascites and no mass. There is no tenderness. There is no rigidity and no " guarding.   Multiple surgical scars present.   Musculoskeletal: He exhibits no edema or deformity.        Right shoulder: Normal.        Left shoulder: Normal.        Cervical back: He exhibits decreased range of motion and tenderness. He exhibits no bony tenderness, no swelling, no edema, no deformity, no laceration and no spasm.        Thoracic back: Normal.   Lymphadenopathy:     He has no cervical adenopathy.   Neurological: He is alert and oriented to person, place, and time. He has normal strength. He displays no atrophy and no tremor. No cranial nerve deficit or sensory deficit. He exhibits normal muscle tone. Gait (Mildly antalgic gait.  Ambulates with a single-point cane.  One person assistance necessary to step up to and down from the exam table.) abnormal.   Reflex Scores:       Tricep reflexes are 2+ on the right side and 2+ on the left side.       Bicep reflexes are 2+ on the right side and 2+ on the left side.       Brachioradialis reflexes are 2+ on the right side and 2+ on the left side.  Radial and pedal pulses intact bilateral.   Skin: Skin is warm and dry. He is not diaphoretic.   Psychiatric: He has a normal mood and affect. His behavior is normal. Judgment and thought content normal.   Nursing note and vitals reviewed.        Assessment:       1. Primary osteoarthritis involving multiple joints    2. Recurrent occipital headache          Plan:   Primary osteoarthritis involving multiple joints    Recurrent occipital headache  -     CT Head Without Contrast; Future; Expected date: 02/13/2020      Follow up if symptoms worsen or fail to improve.    Jorgito appears to be stable and doing well today on all of his current medications.  I did review all of his chronic medical problems with him as well as the risks and benefits of his current medications and reviewed all health maintenance issues with him before determining that he is not changing providers and [facility] and was only here for a 2nd opinion of his recurrent occipital headaches that appear classic for tension headache related to his severe arthritis in his neck and throughout many of his joints.  I advised him that as his headaches are mild to moderate and without any associated neurologic or other symptoms outside of neck pain and tight muscles that no imaging was necessary but he was very interested in the head CT and noted that he was very concerned about having a brain tumor as he had a friend recently diagnosed with a brain tumor as the cause of her chronic severe headaches.  He does understand that his insurance may deny the need for the imaging and he may be willing to pay out-of-pocket for this.  I advised him I would be happy to place this order after I discussed the risks of completing a head CT without contrast.  He had no concerns about radiation exposure the or incidental findings that may require more workup or other interventions.  I have placed this order for him.  He will [facility] but advised him I would be happy to see him back at any time if he would like a 2nd opinion or has any problems and cannot be seen and evaluated there.  I did asked my nurse to obtain all of his past medical records from the [facility] to update his health maintenance issues.    Jorgito was seen for a 40 min visit and greater than half this time was spent counseling on the above.

## 2020-02-17 ENCOUNTER — HOSPITAL ENCOUNTER (OUTPATIENT)
Dept: RADIOLOGY | Facility: HOSPITAL | Age: 70
Discharge: HOME | End: 2020-02-17
Attending: FAMILY MEDICINE
Payer: MEDICARE

## 2020-02-17 DIAGNOSIS — R51.9 RECURRENT OCCIPITAL HEADACHE: ICD-10-CM

## 2020-02-17 PROCEDURE — 70450 CT HEAD/BRAIN W/O DYE: CPT | Mod: TC

## 2020-02-17 PROCEDURE — 70450 CT HEAD WITHOUT CONTRAST: ICD-10-PCS | Mod: 26,,, | Performed by: RADIOLOGY

## 2020-02-17 PROCEDURE — 70450 CT HEAD/BRAIN W/O DYE: CPT | Mod: 26,,, | Performed by: RADIOLOGY

## 2020-02-19 DIAGNOSIS — R51.9 RECURRENT OCCIPITAL HEADACHE: ICD-10-CM

## 2020-02-19 DIAGNOSIS — R93.0 ABNORMAL CT SCAN OF HEAD: Primary | ICD-10-CM

## 2020-02-20 ENCOUNTER — TELEPHONE (OUTPATIENT)
Dept: FAMILY MEDICINE | Facility: CLINIC | Age: 70
End: 2020-02-20

## 2020-02-20 NOTE — TELEPHONE ENCOUNTER
----- Message from Lisette Quinn sent at 2/20/2020  1:13 PM CST -----  Pt can be reached at [phone]      Pt is calling for  CT brain lab test  results.      Thank you!

## 2020-02-24 ENCOUNTER — TELEPHONE (OUTPATIENT)
Dept: FAMILY MEDICINE | Facility: CLINIC | Age: 70
End: 2020-02-24

## 2020-02-24 NOTE — TELEPHONE ENCOUNTER
----- Message from Orquidea Gamble sent at 2/24/2020 11:22 AM CST -----  Type:  Test Results    Who Called:  Jomar  Name of Test (Lab/Mammo/Etc):  CT  Date of Test:  2/17  Ordering Provider:  Dr Ortiz  Where the test was performed:  [facility]  Best Call Back Number:  [phone]  Additional Information:  Thank you!

## 2020-02-27 ENCOUNTER — TELEPHONE (OUTPATIENT)
Dept: ENDOCRINOLOGY | Facility: CLINIC | Age: 70
End: 2020-02-27

## 2020-02-27 ENCOUNTER — TELEPHONE (OUTPATIENT)
Dept: FAMILY MEDICINE | Facility: CLINIC | Age: 70
End: 2020-02-27

## 2020-02-27 NOTE — TELEPHONE ENCOUNTER
----- Message from Maricruz Vieyra sent at 2/27/2020 10:37 AM CST -----  Contact: pt   Type: Needs Medical Advice    Who Called:  Pt   Symptoms (please be specific):    How long has patient had these symptoms:   Pharmacy name and phone #:  Best Call Back Number: [phone] (home)   Additional Information: pt stated that he has sent several message to get his CT result, but has not received a call back,pls call to advise

## 2020-02-27 NOTE — TELEPHONE ENCOUNTER
We have attempted to contact patient several times since last week regarding his result. Calls always go straight to voicemail. LM to call back.

## 2020-02-27 NOTE — TELEPHONE ENCOUNTER
----- Message from Fiona Sauer MA sent at 2/27/2020 11:01 AM CST -----  Contact: self/[phone]  Pt is requesting a call back in reference to scheduling a new patient appt.Referral in Chart from Dr. Ortiz.PT stated no one has reached out to him.                  (# unable to schedule appt/ redirected me to send a msg for scheduling) Pt prefer the [facility].

## 2020-02-28 DIAGNOSIS — Z12.11 COLON CANCER SCREENING: ICD-10-CM

## 2020-10-08 ENCOUNTER — HOSPITAL ENCOUNTER (EMERGENCY)
Facility: HOSPITAL | Age: 70
Discharge: HOME | End: 2020-10-08
Attending: EMERGENCY MEDICINE
Payer: MEDICARE

## 2020-10-08 ENCOUNTER — NURSE TRIAGE (OUTPATIENT)
Dept: ADMINISTRATIVE | Facility: CLINIC | Age: 70
End: 2020-10-08

## 2020-10-08 VITALS
HEIGHT: 70 IN | WEIGHT: 238 LBS | TEMPERATURE: 99 F | HEART RATE: 68 BPM | DIASTOLIC BLOOD PRESSURE: 81 MMHG | SYSTOLIC BLOOD PRESSURE: 160 MMHG | BODY MASS INDEX: 34.07 KG/M2 | OXYGEN SATURATION: 98 % | RESPIRATION RATE: 16 BRPM

## 2020-10-08 DIAGNOSIS — N30.91 HEMORRHAGIC CYSTITIS: Primary | ICD-10-CM

## 2020-10-08 LAB
ALBUMIN SERPL BCP-MCNC: 3.5 G/DL (ref 3.5–5.2)
ALP SERPL-CCNC: 80 U/L (ref 55–135)
ALT SERPL W/O P-5'-P-CCNC: 25 U/L (ref 10–44)
ANION GAP SERPL CALC-SCNC: 8 MMOL/L (ref 8–16)
AST SERPL-CCNC: 21 U/L (ref 10–40)
BACTERIA #/AREA URNS HPF: ABNORMAL /HPF
BASOPHILS # BLD AUTO: 0.04 K/UL (ref 0–0.2)
BASOPHILS NFR BLD: 0.3 % (ref 0–1.9)
BILIRUB SERPL-MCNC: 1 MG/DL (ref 0.1–1)
BILIRUB UR QL STRIP: ABNORMAL
BUN SERPL-MCNC: 24 MG/DL (ref 8–23)
CALCIUM SERPL-MCNC: 8.7 MG/DL (ref 8.7–10.5)
CHLORIDE SERPL-SCNC: 103 MMOL/L (ref 95–110)
CLARITY UR: ABNORMAL
CO2 SERPL-SCNC: 24 MMOL/L (ref 23–29)
COLOR UR: ABNORMAL
CREAT SERPL-MCNC: 0.9 MG/DL (ref 0.5–1.4)
DIFFERENTIAL METHOD: ABNORMAL
EOSINOPHIL # BLD AUTO: 0.1 K/UL (ref 0–0.5)
EOSINOPHIL NFR BLD: 0.8 % (ref 0–8)
ERYTHROCYTE [DISTWIDTH] IN BLOOD BY AUTOMATED COUNT: 13.6 % (ref 11.5–14.5)
EST. GFR  (AFRICAN AMERICAN): >60 ML/MIN/1.73 M^2
EST. GFR  (NON AFRICAN AMERICAN): >60 ML/MIN/1.73 M^2
GLUCOSE SERPL-MCNC: 108 MG/DL (ref 70–110)
GLUCOSE UR QL STRIP: NEGATIVE
GRAN CASTS #/AREA URNS LPF: 1 /LPF
HCT VFR BLD AUTO: 36.4 % (ref 40–54)
HGB BLD-MCNC: 11.9 G/DL (ref 14–18)
HGB UR QL STRIP: ABNORMAL
HYALINE CASTS #/AREA URNS LPF: 132 /LPF
IMM GRANULOCYTES # BLD AUTO: 0.06 K/UL (ref 0–0.04)
IMM GRANULOCYTES NFR BLD AUTO: 0.5 % (ref 0–0.5)
KETONES UR QL STRIP: NEGATIVE
LEUKOCYTE ESTERASE UR QL STRIP: ABNORMAL
LIPASE SERPL-CCNC: 15 U/L (ref 4–60)
LYMPHOCYTES # BLD AUTO: 1.2 K/UL (ref 1–4.8)
LYMPHOCYTES NFR BLD: 8.9 % (ref 18–48)
MCH RBC QN AUTO: 27.6 PG (ref 27–31)
MCHC RBC AUTO-ENTMCNC: 32.7 G/DL (ref 32–36)
MCV RBC AUTO: 85 FL (ref 82–98)
MICROSCOPIC COMMENT: ABNORMAL
MONOCYTES # BLD AUTO: 1 K/UL (ref 0.3–1)
MONOCYTES NFR BLD: 7.6 % (ref 4–15)
NEUTROPHILS # BLD AUTO: 10.8 K/UL (ref 1.8–7.7)
NEUTROPHILS NFR BLD: 81.9 % (ref 38–73)
NITRITE UR QL STRIP: POSITIVE
NRBC BLD-RTO: 0 /100 WBC
PH UR STRIP: 7 [PH] (ref 5–8)
PLATELET # BLD AUTO: 225 K/UL (ref 150–350)
PMV BLD AUTO: 9.4 FL (ref 9.2–12.9)
POTASSIUM SERPL-SCNC: 3.7 MMOL/L (ref 3.5–5.1)
PROT SERPL-MCNC: 6.7 G/DL (ref 6–8.4)
PROT UR QL STRIP: ABNORMAL
RBC # BLD AUTO: 4.31 M/UL (ref 4.6–6.2)
RBC #/AREA URNS HPF: >100 /HPF (ref 0–4)
SODIUM SERPL-SCNC: 135 MMOL/L (ref 136–145)
SP GR UR STRIP: 1.02 (ref 1–1.03)
SQUAMOUS #/AREA URNS HPF: 0 /HPF
URN SPEC COLLECT METH UR: ABNORMAL
UROBILINOGEN UR STRIP-ACNC: 1 EU/DL
WBC # BLD AUTO: 13.16 K/UL (ref 3.9–12.7)
WBC #/AREA URNS HPF: >100 /HPF (ref 0–5)

## 2020-10-08 PROCEDURE — 87086 URINE CULTURE/COLONY COUNT: CPT

## 2020-10-08 PROCEDURE — 63600175 PHARM REV CODE 636 W HCPCS: Performed by: STUDENT IN AN ORGANIZED HEALTH CARE EDUCATION/TRAINING PROGRAM

## 2020-10-08 PROCEDURE — 83690 ASSAY OF LIPASE: CPT

## 2020-10-08 PROCEDURE — 85025 COMPLETE CBC W/AUTO DIFF WBC: CPT

## 2020-10-08 PROCEDURE — 96374 THER/PROPH/DIAG INJ IV PUSH: CPT | Mod: 59

## 2020-10-08 PROCEDURE — 87186 SC STD MICRODIL/AGAR DIL: CPT

## 2020-10-08 PROCEDURE — 81001 URINALYSIS AUTO W/SCOPE: CPT

## 2020-10-08 PROCEDURE — 80053 COMPREHEN METABOLIC PANEL: CPT

## 2020-10-08 PROCEDURE — 87077 CULTURE AEROBIC IDENTIFY: CPT

## 2020-10-08 PROCEDURE — 36415 COLL VENOUS BLD VENIPUNCTURE: CPT

## 2020-10-08 PROCEDURE — 25500020 PHARM REV CODE 255: Performed by: EMERGENCY MEDICINE

## 2020-10-08 PROCEDURE — 99285 EMERGENCY DEPT VISIT HI MDM: CPT | Mod: 25

## 2020-10-08 RX ORDER — CEPHALEXIN 500 MG/1
500 CAPSULE ORAL EVERY 12 HOURS
Qty: 20 CAPSULE | Refills: 0 | Status: SHIPPED | OUTPATIENT
Start: 2020-10-08 | End: 2020-10-18

## 2020-10-08 RX ADMIN — CEFTRIAXONE 1 G: 1 INJECTION, SOLUTION INTRAVENOUS at 03:10

## 2020-10-08 RX ADMIN — IOHEXOL 100 ML: 350 INJECTION, SOLUTION INTRAVENOUS at 01:10

## 2020-10-08 NOTE — TELEPHONE ENCOUNTER
Spoke with with wife and pt:  Pt with urination of pure blood today. Rare pain to tip of penis needle like. Has had prostate removed.   instructed to go to ED now for evaluation.  instructed wife would send message to [facility] office re wants to be new pt.       Reason for Disposition   Blood in the urine is main symptom   Passing pure blood or large blood clots (i.e., larger than a dime or grape)    Additional Information   Negative: Shock suspected (e.g., cold/pale/clammy skin, too weak to stand, low BP, rapid pulse)   Negative: Sounds like a life-threatening emergency to the triager   Negative: Shock suspected (e.g., cold/pale/clammy skin, too weak to stand, low BP, rapid pulse)   Negative: Sounds like a life-threatening emergency to the triager   Negative: Recent back or abdominal injury   Negative: Recent genital injury   Negative: Unable to urinate (or only a few drops) > 4 hours and bladder feels very full (e.g., palpable bladder or strong urge to urinate)    Protocols used: URINARY SYMPTOMS-A-OH, URINE - BLOOD IN-A-OH

## 2020-10-08 NOTE — DISCHARGE INSTRUCTIONS
If you start having flank pain, inability to urinate, a temperature greater than 100.4° F, or any other concerns, you should return to the emergency department.  Otherwise, follow [facility] with [facility].

## 2020-10-08 NOTE — ED PROVIDER NOTES
Encounter Date: 10/8/2020       History     Chief Complaint   Patient presents with    Hematuria     X 1 DAY    Dysuria     70-year-old male past medical history significant for hypertension, hyperlipidemia, GERD who presents to the emergency department for hematuria, dysuria.  Patient states the symptoms have been going on since yesterday, states that they happen each time that he goes to bathroom, states he does not feel like he is completely emptying his bladder.  States he has been having hematuria without pain, states he had a previous prostatectomy secondary to past prostate cancer.  States this was a remote surgery.  States that he has not any recent fever, no known sick contacts, denies any history of STIs.  Has not noticed passage of clots since this occurred.  Has had occasional bright red blood as well as dark blood, states that he has not had any associated flank pain.        Review of patient's allergies indicates:  No Known Allergies  Past Medical History:   Diagnosis Date    Anxiety     Arthritis     Cancer 2005    Prostate    Depression     Encounter for blood transfusion     GERD (gastroesophageal reflux disease)     Hyperlipemia     Hypertension     Neuropathy     Obstructive sleep apnea on CPAP     PTSD (post-traumatic stress disorder)      Past Surgical History:   Procedure Laterality Date    BACK SURGERY      burnt nerves      ELBOW SURGERY  ,     left, right    GALLBLADDER SURGERY      JOINT REPLACEMENT  ,     Bilateral knees    KNEE SURGERY  ,     left and right knee    Neck surgery with plate      Crushed C2-3-4    PROSTATECTOMY      SHOULDER SURGERY      right shoulder     No family history on file.  Social History     Tobacco Use    Smoking status: Former Smoker     Packs/day: 0.50     Years: 10.00     Pack years: 5.00     Types: Cigarettes     Quit date: 1981     Years since quittin.6    Smokeless tobacco: Never Used    Substance Use Topics    Alcohol use: Yes     Comment: rare socially     Drug use: No     Review of Systems   Constitutional: Negative for chills and fever.   HENT: Negative for drooling and tinnitus.    Eyes: Negative for photophobia and visual disturbance.   Respiratory: Negative for shortness of breath and wheezing.    Cardiovascular: Negative for chest pain and palpitations.   Gastrointestinal: Negative for abdominal pain, diarrhea, nausea and vomiting.   Genitourinary: Positive for dysuria and hematuria.   Musculoskeletal: Negative for neck pain and neck stiffness.   Skin: Negative for color change and pallor.   Neurological: Negative for light-headedness and numbness.   Psychiatric/Behavioral: Negative for agitation and confusion.       Physical Exam     Initial Vitals [10/08/20 1116]   BP Pulse Resp Temp SpO2   (!) 143/60 101 20 98.6 °F (37 °C) 97 %      MAP       --         Physical Exam    Nursing note and vitals reviewed.  Constitutional: He appears well-developed and well-nourished. No distress.   Nontoxic appearing, sitting comfortably in bed   HENT:   Head: Normocephalic and atraumatic.   Mouth/Throat: No oropharyngeal exudate.   Eyes: EOM are normal. Pupils are equal, round, and reactive to light.   Neck: Normal range of motion. Neck supple.   Cardiovascular: Normal rate and regular rhythm. Exam reveals no gallop and no friction rub.    No murmur heard.  Pulmonary/Chest: Breath sounds normal. No respiratory distress. He has no wheezes. He has no rhonchi. He has no rales.   Abdominal: Soft. He exhibits no distension. There is no abdominal tenderness. There is no rebound and no guarding.   No tenderness of McBurney's, negative Rovsing, negative Alfaro   Genitourinary:    Genitourinary Comments: Small amount of blood noted in the urethral meatus, however otherwise atraumatic appearing, no high-riding testicle, circumcised male     Musculoskeletal: Normal range of motion.   Neurological: He is alert and  oriented to person, place, and time. No cranial nerve deficit.   Skin: Skin is warm and dry.   Psychiatric: He has a normal mood and affect. Thought content normal.         ED Course   Procedures  Labs Reviewed   URINALYSIS, REFLEX TO URINE CULTURE - Abnormal; Notable for the following components:       Result Value    Color, UA Brown (*)     Appearance, UA Cloudy (*)     Protein, UA 2+ (*)     Bilirubin (UA) 2+ (*)     Occult Blood UA 3+ (*)     Nitrite, UA Positive (*)     Leukocytes, UA 3+ (*)     All other components within normal limits    Narrative:     Specimen Source->Urine   CBC W/ AUTO DIFFERENTIAL - Abnormal; Notable for the following components:    WBC 13.16 (*)     RBC 4.31 (*)     Hemoglobin 11.9 (*)     Hematocrit 36.4 (*)     Gran # (ANC) 10.8 (*)     Immature Grans (Abs) 0.06 (*)     Gran% 81.9 (*)     Lymph% 8.9 (*)     All other components within normal limits   COMPREHENSIVE METABOLIC PANEL - Abnormal; Notable for the following components:    Sodium 135 (*)     BUN, Bld 24 (*)     All other components within normal limits   URINALYSIS MICROSCOPIC - Abnormal; Notable for the following components:    RBC, UA >100 (*)     WBC, UA >100 (*)     Hyaline Casts,  (*)     Granular Casts, UA 1 (*)     All other components within normal limits    Narrative:     Specimen Source->Urine   CULTURE, URINE   LIPASE          Imaging Results          CT Abdomen Pelvis With Contrast (Final result)  Result time 10/08/20 13:53:46    Final result by Raza Vargas MD (10/08/20 13:53:46)                 Narrative:    CT ABDOMEN AND PELVIS WITH CONTRAST    HISTORY: Painless hematuria    CMS MANDATED QUALITY DATA - CT RADIATION  436    All CT scans at this facility utilize dose modulation, iterative  reconstruction, and/or weight based dosing when appropriate to reduce  radiation dose to as low as reasonably achievable    FINDINGS:    Post infusion images were obtained from the lung bases to the  pubic  symphysis. 100 cc nonionic contrast was administered for the  examination.    The left lung base is normal. There is a 3 mm noncalcified nodule in  the right middle lobe, likely an incidental granuloma.    The liver has a normal appearance. The gallbladder is absent. Common  bile duct is mildly prominent at 10 mm, probably normal for  postcholecystectomy state. The spleen has normal appearance. Fatty  replacement of the pancreas is noted. The adrenal glands are  unremarkable. Tiny bilateral renal cysts are noted. The abdominal  aorta is normal in caliber with moderate atherosclerotic  calcification.    There is no pathologic bowel wall thickening or evidence of  obstruction. No free air, free fluid, or lymphadenopathy is  identified.    Images of the pelvis demonstrate no mass or lymphadenopathy. There is  no free air or free fluid. Bowel structures are within normal limits.  There is no CT evidence of bladder mass or significant pathologic  bowel wall thickening. The urinary bladder appears slightly  trabeculated. The prostate gland appears to be surgically absent    No acute osseous abnormalities are identified. There is multilevel  lumbar degenerative disc disease and postoperative change. At the L1-2  level, facet hypertrophy and dorsal osteophytic ridging combine to  result in severe spinal stenosis.    IMPRESSION:      1. No acute intra-abdominal or intrapelvic abnormalities are  identified.  2. No abnormality to account for provided clinical history of painless  hematuria is demonstrated.  3. Mild bladder wall trabeculation, with no definite bladder mass  identified.  4. Severe spinal stenosis at the L1-2 level secondary to facet  hypertrophy and dorsal osteophytic ridging.  5. Additional chronic findings as noted above.    Electronically Signed by Charles Vargas M.D. on 10/8/2020 2:07 PM                                         Attending Attestation:   Physician Attestation Statement for Resident:  As  the supervising MD   Physician Attestation Statement: I have personally seen and examined this patient.   I agree with the above history. -:   As the supervising MD I agree with the above PE.    As the supervising MD I agree with the above treatment, course, plan, and disposition.  I have reviewed and agree with the residents interpretation of the following: lab data and CT scans.                              Clinical Impression:     ICD-10-CM ICD-9-CM   1. Hemorrhagic cystitis  N30.91 595.9                          ED Disposition Condition    Discharge Stable        ED Prescriptions     Medication Sig Dispense Start Date End Date Auth. Provider    cephALEXin (KEFLEX) 500 MG capsule Take 1 capsule (500 mg total) by mouth every 12 (twelve) hours. for 10 days 20 capsule 10/8/2020 10/18/2020 Dora Mcgill NP        Follow-up Information     Follow up With Specialties Details Why Contact Info Additional Information    [facility]  Schedule an appointment as soon as possible for a visit on 10/12/2020 For follow up for your urinary tract infection      [facility]  As needed, If symptoms worsen [address] [id number]  [phone] 1st floor                                       Ashkan Martinez MD  10/08/20 6297

## 2020-10-09 ENCOUNTER — TELEPHONE (OUTPATIENT)
Dept: UROLOGY | Facility: CLINIC | Age: 70
End: 2020-10-09

## 2020-10-09 RX ORDER — LOSARTAN POTASSIUM 50 MG/1
50 TABLET ORAL DAILY
COMMUNITY

## 2020-10-09 NOTE — TELEPHONE ENCOUNTER
----- Message from Ne Skinner sent at 10/9/2020  8:47 AM CDT -----  Type:  Patient Returning Call    Who Called:  Wife (Nicolasa)  Who Left Message for Patient:  Callie  Does the patient know what this is regarding?:  message left yesterday  Best Call Back Number:  [phone]  Additional Information: stated ER told patient to schedule appt as soon as possible

## 2020-10-09 NOTE — TELEPHONE ENCOUNTER
I spoke with the pt's wife and offered an appointment for today of which she declined. She requested an appointment on 10/23/20 of which she requested. An appointment was scheduled per her request. She was instructed to call the office with any problems. Understanding was verbalized.

## 2020-10-10 LAB — BACTERIA UR CULT: ABNORMAL

## 2020-10-10 NOTE — ED NOTES
10/10/2020 0945 pt called  [phone]. to inform of pos urine culture, need for home med antibiotic. Pt states understanding to  med & take as directed, return for any worsening symptoms. Pt choice of pharmacy called, [facility] pharmacy [phone]. Omnicef 300mg po bid x 10 days called in for pt.

## 2020-10-19 ENCOUNTER — PES CALL (OUTPATIENT)
Dept: ADMINISTRATIVE | Facility: CLINIC | Age: 70
End: 2020-10-19

## 2020-10-23 ENCOUNTER — OFFICE VISIT (OUTPATIENT)
Dept: UROLOGY | Facility: CLINIC | Age: 70
End: 2020-10-23
Payer: MEDICARE

## 2020-10-23 VITALS
RESPIRATION RATE: 18 BRPM | SYSTOLIC BLOOD PRESSURE: 130 MMHG | TEMPERATURE: 99 F | BODY MASS INDEX: 34.09 KG/M2 | DIASTOLIC BLOOD PRESSURE: 77 MMHG | WEIGHT: 238.13 LBS | HEART RATE: 86 BPM | HEIGHT: 70 IN

## 2020-10-23 DIAGNOSIS — C61 PROSTATE CANCER: Primary | ICD-10-CM

## 2020-10-23 DIAGNOSIS — R31.9 HEMATURIA, UNSPECIFIED TYPE: ICD-10-CM

## 2020-10-23 PROCEDURE — 99204 PR OFFICE/OUTPT VISIT, NEW, LEVL IV, 45-59 MIN: ICD-10-PCS | Mod: S$GLB,,, | Performed by: UROLOGY

## 2020-10-23 PROCEDURE — 1159F MED LIST DOCD IN RCRD: CPT | Mod: S$GLB,,, | Performed by: UROLOGY

## 2020-10-23 PROCEDURE — 1126F AMNT PAIN NOTED NONE PRSNT: CPT | Mod: S$GLB,,, | Performed by: UROLOGY

## 2020-10-23 PROCEDURE — 3008F PR BODY MASS INDEX (BMI) DOCUMENTED: ICD-10-PCS | Mod: CPTII,S$GLB,, | Performed by: UROLOGY

## 2020-10-23 PROCEDURE — 1159F PR MEDICATION LIST DOCUMENTED IN MEDICAL RECORD: ICD-10-PCS | Mod: S$GLB,,, | Performed by: UROLOGY

## 2020-10-23 PROCEDURE — 3008F BODY MASS INDEX DOCD: CPT | Mod: CPTII,S$GLB,, | Performed by: UROLOGY

## 2020-10-23 PROCEDURE — 1101F PR PT FALLS ASSESS DOC 0-1 FALLS W/OUT INJ PAST YR: ICD-10-PCS | Mod: CPTII,S$GLB,, | Performed by: UROLOGY

## 2020-10-23 PROCEDURE — 1101F PT FALLS ASSESS-DOCD LE1/YR: CPT | Mod: CPTII,S$GLB,, | Performed by: UROLOGY

## 2020-10-23 PROCEDURE — 99204 OFFICE O/P NEW MOD 45 MIN: CPT | Mod: S$GLB,,, | Performed by: UROLOGY

## 2020-10-23 PROCEDURE — 99999 PR PBB SHADOW E&M-EST. PATIENT-LVL III: CPT | Mod: PBBFAC,,, | Performed by: UROLOGY

## 2020-10-23 PROCEDURE — 99999 PR PBB SHADOW E&M-EST. PATIENT-LVL III: ICD-10-PCS | Mod: PBBFAC,,, | Performed by: UROLOGY

## 2020-10-23 PROCEDURE — 1126F PR PAIN SEVERITY QUANTIFIED, NO PAIN PRESENT: ICD-10-PCS | Mod: S$GLB,,, | Performed by: UROLOGY

## 2020-10-23 NOTE — PROGRESS NOTES
"[unfilled]  [id number]  AGE:  70 y.o.     10/23/2020      DRUG ALLERGIES:  NKDA    CHIEF COMPLAINT:  Gross hematuria, adenocarcinoma prostate     HISTORY OF PRESENT ILLNESS:  This 70-year-old male presents recheck up the was seen emergency room at [facility] on 10/08/2020 with gross hematuria .  CT scan done at that time in the emergency room essentially unremarkable with no clear explanation for the hematuria and no abnormalities to account for the hematuria.  He was treated with antibiotics that included Omnicef and Ceftin which has completed and he has had no further episodes of hematuria for approximately 10 days.  Is doing well to on today's visit with no complaints.  He also has a history of adenocarcinoma prostate for which he underwent a robotic radical prostatectomy in  at the [facility] with Dr. Bowen.    MEDICAL HISTORY:  Hypertension, depression, GERD, hyperlipidemia, sleep apnea, PTSD with neuropathy affecting especially right lower extremity.     PAST SURGICAL HISTORY:  Cholecystectomy, bilateral knee surgeries, bilateral shoulder surgeries, nerve ablation of neck with plate, back surgeries, radical retropubic robotic prostatectomy.    PRESENT MEDICATIONS:  Lasix, Lipitor, BuSpar, Cymbalta, Neurontin, Atarax, Cozaar, Robaxin, Ditropan 5 mg, Protonix, Naprosyn    FAMILY HISTORY:  Mother  from heart disease.  Father  from brain and esophageal cancer    SOCIAL HISTORY:  Retired.  .  Two of the daughters.  Tobacco none.  Alcohol none.     REVIEW OF SYMPTOMS:  GENERAL:  No weight change.  Good appetite.  HEAD AND NECK:  No headaches.  Wears prescription glasses.  CARDIORESPIRATORY:  No chest pain or shortness of breath no cough   GASTROINTESTINAL:  No trouble swallowing no constipation or diarrhea.  MUSCULOSKELETAL:  No joint or back problems.    PHYSICAL EXAMINATION:  VITAL SIGNS:  /77   Pulse 86   Temp 98.6 °F (37 °C) (Oral)   Resp 18   Ht 5' 10" " (1.778 m)   Wt 108 kg (238 lb 1.6 oz)   BMI 34.16 kg/m²   GENERAL:  Well-developed, well-nourished 70 y.o.  -year-old male, alert, oriented, cooperative, in no acute distress.  HEAD AND NECK:  Throat clear.  No adenopathy.  CHEST:  Clear.  HEART:  Regular.  No murmur.  ABDOMEN:  Soft, benign and nontender.  No masses.  No hernias.  No organomegaly.  EXTERNAL GENITAL:  Normal phallus with adequate meatus.  Testes descended and   feel normal.  No scrotal masses.  RECTAL:  Empty flattened prostatic fossa  No nodules.  Normal sphincter tone.    UA negative pH 5.5    PSA  - 0.01 on 05/28/2010    FINAL IMPRESSION:  Adenocarcinoma prostate, status post radical retropubic prostatectomy, hematuria that has resolved and appears to be due to UTI that was treated.    RECOMMENDATIONS:  Routine recheck in 4-6 weeks to check the urine again for hematuria.  PSA which he will be due.

## 2020-10-27 ENCOUNTER — TELEPHONE (OUTPATIENT)
Dept: UROLOGY | Facility: CLINIC | Age: 70
End: 2020-10-27

## 2020-10-27 LAB — PSA SERPL-MCNC: <0.1 NG/ML

## 2020-10-27 NOTE — TELEPHONE ENCOUNTER
----- Message from Hardeep Euceda MD sent at 10/27/2020  8:57 AM CDT -----  PSA less than 0.1    Rec - keep appointment

## 2020-10-28 ENCOUNTER — OFFICE VISIT (OUTPATIENT)
Dept: HOME HEALTH SERVICES | Facility: CLINIC | Age: 70
End: 2020-10-28
Payer: MEDICARE

## 2020-10-28 VITALS
HEIGHT: 70 IN | SYSTOLIC BLOOD PRESSURE: 168 MMHG | BODY MASS INDEX: 33.36 KG/M2 | TEMPERATURE: 98 F | WEIGHT: 233 LBS | HEART RATE: 82 BPM | DIASTOLIC BLOOD PRESSURE: 75 MMHG | OXYGEN SATURATION: 97 %

## 2020-10-28 DIAGNOSIS — Z00.00 ENCOUNTER FOR PREVENTIVE HEALTH EXAMINATION: Primary | ICD-10-CM

## 2020-10-28 DIAGNOSIS — F32.A ANXIETY AND DEPRESSION: ICD-10-CM

## 2020-10-28 DIAGNOSIS — K21.9 GASTROESOPHAGEAL REFLUX DISEASE, UNSPECIFIED WHETHER ESOPHAGITIS PRESENT: ICD-10-CM

## 2020-10-28 DIAGNOSIS — I10 HYPERTENSION, UNSPECIFIED TYPE: ICD-10-CM

## 2020-10-28 DIAGNOSIS — J84.10 LUNG GRANULOMA: ICD-10-CM

## 2020-10-28 DIAGNOSIS — N32.81 OVERACTIVE BLADDER: ICD-10-CM

## 2020-10-28 DIAGNOSIS — Z91.81 AT HIGH RISK FOR FALLS: ICD-10-CM

## 2020-10-28 DIAGNOSIS — R51.9 RECURRENT OCCIPITAL HEADACHE: ICD-10-CM

## 2020-10-28 DIAGNOSIS — I70.0 CALCIFICATION OF AORTA: ICD-10-CM

## 2020-10-28 DIAGNOSIS — R26.81 GAIT INSTABILITY: ICD-10-CM

## 2020-10-28 DIAGNOSIS — F41.9 ANXIETY AND DEPRESSION: ICD-10-CM

## 2020-10-28 DIAGNOSIS — G47.33 OBSTRUCTIVE SLEEP APNEA ON CPAP: ICD-10-CM

## 2020-10-28 DIAGNOSIS — E78.5 HYPERLIPIDEMIA, UNSPECIFIED HYPERLIPIDEMIA TYPE: ICD-10-CM

## 2020-10-28 DIAGNOSIS — R73.03 PREDIABETES: ICD-10-CM

## 2020-10-28 PROCEDURE — G0439 PR MEDICARE ANNUAL WELLNESS SUBSEQUENT VISIT: ICD-10-PCS | Mod: S$GLB,,, | Performed by: NURSE PRACTITIONER

## 2020-10-28 PROCEDURE — G0439 PPPS, SUBSEQ VISIT: HCPCS | Mod: S$GLB,,, | Performed by: NURSE PRACTITIONER

## 2020-10-29 PROBLEM — I70.0 CALCIFICATION OF AORTA: Status: ACTIVE | Noted: 2020-10-29

## 2020-10-29 PROBLEM — R26.81 GAIT INSTABILITY: Status: ACTIVE | Noted: 2020-10-29

## 2020-10-29 PROBLEM — Z91.81 AT HIGH RISK FOR FALLS: Status: ACTIVE | Noted: 2020-10-29

## 2020-10-29 PROBLEM — I10 HYPERTENSION: Status: ACTIVE | Noted: 2020-10-29

## 2020-10-29 PROBLEM — N32.81 OVERACTIVE BLADDER: Status: ACTIVE | Noted: 2020-10-29

## 2020-10-29 PROBLEM — K21.9 GERD (GASTROESOPHAGEAL REFLUX DISEASE): Status: ACTIVE | Noted: 2020-10-29

## 2020-10-29 PROBLEM — J84.10 LUNG GRANULOMA: Status: ACTIVE | Noted: 2020-10-29

## 2020-10-29 NOTE — PROGRESS NOTES
"  Jorgito Mora III presented for a  Medicare AWV and comprehensive Health Risk Assessment today. The following components were reviewed and updated:    · Medical history  · Family History  · Social history  · Allergies and Current Medications  · Health Risk Assessment  · Health Maintenance  · Care Team     ** See Completed Assessments for Annual Wellness Visit within the encounter summary.**         The following assessments were completed:  · Living Situation  · CAGE  · Depression Screening  · Timed Get Up and Go  · Whisper Test  · Cognitive Function Screening  ·   · Nutrition Screening  · ADL Screening  · PAQ Screening        Vitals:    10/28/20 1005   BP: (!) 168/75   Pulse: 82   Temp: 97.9 °F (36.6 °C)   SpO2: 97%   Weight: 105.7 kg (233 lb)   Height: 5' 10" (1.778 m)     Body mass index is 33.43 kg/m².  Physical Exam  Vitals signs reviewed.   Constitutional:       Appearance: He is well-developed. He is obese.   HENT:      Head: Normocephalic.   Eyes:      Pupils: Pupils are equal, round, and reactive to light.   Neck:      Musculoskeletal: Normal range of motion.   Cardiovascular:      Rate and Rhythm: Normal rate and regular rhythm.      Heart sounds: Normal heart sounds.   Pulmonary:      Effort: Pulmonary effort is normal.      Breath sounds: Normal breath sounds.   Abdominal:      General: Bowel sounds are normal. There is no distension.      Palpations: Abdomen is soft.      Tenderness: There is no abdominal tenderness.   Musculoskeletal: Normal range of motion.         General: Deformity (right hand) present.      Right lower leg: No edema.      Left lower leg: No edema.      Comments: Slow, unsteady gait - cane present   Skin:     General: Skin is warm and dry.   Neurological:      Mental Status: He is alert and oriented to person, place, and time.   Psychiatric:         Behavior: Behavior normal.               Diagnoses and health risks identified today and associated " recommendations/orders:    1. Encounter for preventive health examination  AWV Completed  -Discussed preventative maintenance with patient and wife, reports up [facility]    2. Lung granuloma  Stable, followed by [facility]    3. Calcification of aorta  Stable, on statin, followed by [facility]    4. Obstructive sleep apnea on CPAP  CPAP used nightly    5. Prediabetes  Stable, followed by [facility]    6. Hyperlipidemia, unspecified hyperlipidemia type  Stable, [facility] by [facility]    7. Anxiety and depression  Stable, on current regimen, followed by [facility]    8. Gait instability  9. At high risk for falls  -Currently in PT    10. Recurrent occipital headache  Stable, followed by [facility]    11. Overactive bladder  Stable, followed by [facility]    12. Hypertension, unspecified type  Stable, followed by [facility]    13. Gastroesophageal reflux disease, unspecified whether esophagitis present  Stable, [facility] by [facility]      Provided Jorgito with a 5-10 year written screening schedule and personal prevention plan. Recommendations were developed using the USPSTF age appropriate recommendations. Education, counseling, and referrals were provided as needed. After Visit Summary printed and given to patient which includes a list of additional screenings\tests needed.    Follow up in about 1 year (around 10/28/2021) for your next annual wellness visit.    Rocio Simon NP  I offered to discuss end of life issues, including information on how to make advance directives that the patient could use to name someone who would make medical decisions on their behalf if they became too ill to make themselves.    ___Patient declined  _X_Patient is interested, I provided paper work and offered to discuss.

## 2020-10-29 NOTE — PATIENT INSTRUCTIONS
Counseling and Referral of Other Preventative  (Italic type indicates deductible and co-insurance are waived)    Patient Name: Jorgito Mora III  Today's Date: 10/29/2020    Health Maintenance       Date Due Completion Date    Influenza Vaccine (1) 11/06/2020 (Originally 8/1/2020) 10/21/2019    Hepatitis C Screening 11/13/2020 (Originally 1950) ---    TETANUS VACCINE 11/13/2020 (Originally 3/18/1968) ---    Shingles Vaccine (1 of 2) 11/13/2020 (Originally 3/18/2000) ---    Abdominal Aortic Aneurysm Screening 11/13/2020 (Originally 3/18/2015) ---    Lipid Panel 07/08/2021 7/8/2020 (Done)    Override on 7/8/2020[facility]    [facility] on 7/26/2019: [facility] ([facility])    PROSTATE-SPECIFIC ANTIGEN 10/26/2021 10/26/2020    High Dose Statin 10/28/2021 10/28/2020    Colorectal Cancer Screening 01/01/2026 1/1/2016 (Done)    Override on 1/1/2016: Done ([facility])        No orders of the defined types were placed in this encounter.    The following information is provided to all patients.  This information is to help you find resources for any of the problems found today that may be affecting your health:                Living healthy guide: www.[facility].[place].gov      Understanding Diabetes: www.diabetes.org      Eating healthy: www.cdc.gov/healthyweight      CDC home safety checklist: www.cdc.gov/steadi/patient.html      Agency on Aging: www.goea.[place].gov      Alcoholics anonymous (AA): www.aa.org      Physical Activity: www.ana.nih.gov/wr7dutl      Tobacco use: www.quitwithusla.org

## 2020-12-04 ENCOUNTER — OFFICE VISIT (OUTPATIENT)
Dept: UROLOGY | Facility: CLINIC | Age: 70
End: 2020-12-04
Payer: MEDICARE

## 2020-12-04 VITALS
WEIGHT: 233 LBS | BODY MASS INDEX: 33.36 KG/M2 | DIASTOLIC BLOOD PRESSURE: 75 MMHG | RESPIRATION RATE: 18 BRPM | SYSTOLIC BLOOD PRESSURE: 134 MMHG | HEIGHT: 70 IN | HEART RATE: 91 BPM | TEMPERATURE: 100 F

## 2020-12-04 DIAGNOSIS — R31.29 MICROSCOPIC HEMATURIA: Primary | ICD-10-CM

## 2020-12-04 DIAGNOSIS — C61 PROSTATE CANCER: ICD-10-CM

## 2020-12-04 PROCEDURE — 3008F PR BODY MASS INDEX (BMI) DOCUMENTED: ICD-10-PCS | Mod: CPTII,S$GLB,, | Performed by: UROLOGY

## 2020-12-04 PROCEDURE — 99214 PR OFFICE/OUTPT VISIT, EST, LEVL IV, 30-39 MIN: ICD-10-PCS | Mod: 25,S$GLB,, | Performed by: UROLOGY

## 2020-12-04 PROCEDURE — 87186 SC STD MICRODIL/AGAR DIL: CPT

## 2020-12-04 PROCEDURE — 1159F PR MEDICATION LIST DOCUMENTED IN MEDICAL RECORD: ICD-10-PCS | Mod: S$GLB,,, | Performed by: UROLOGY

## 2020-12-04 PROCEDURE — 88112 CYTOPATH CELL ENHANCE TECH: CPT | Mod: 26,,, | Performed by: PATHOLOGY

## 2020-12-04 PROCEDURE — 87077 CULTURE AEROBIC IDENTIFY: CPT

## 2020-12-04 PROCEDURE — 1126F PR PAIN SEVERITY QUANTIFIED, NO PAIN PRESENT: ICD-10-PCS | Mod: S$GLB,,, | Performed by: UROLOGY

## 2020-12-04 PROCEDURE — 88112 CYTOPATH CELL ENHANCE TECH: CPT | Performed by: PATHOLOGY

## 2020-12-04 PROCEDURE — 99999 PR PBB SHADOW E&M-EST. PATIENT-LVL III: ICD-10-PCS | Mod: PBBFAC,,, | Performed by: UROLOGY

## 2020-12-04 PROCEDURE — 1101F PT FALLS ASSESS-DOCD LE1/YR: CPT | Mod: CPTII,S$GLB,, | Performed by: UROLOGY

## 2020-12-04 PROCEDURE — 81000 URINALYSIS NONAUTO W/SCOPE: CPT | Mod: S$GLB,,, | Performed by: UROLOGY

## 2020-12-04 PROCEDURE — 1159F MED LIST DOCD IN RCRD: CPT | Mod: S$GLB,,, | Performed by: UROLOGY

## 2020-12-04 PROCEDURE — 88112 PR  CYTOPATH, CELL ENHANCE TECH: ICD-10-PCS | Mod: 26,,, | Performed by: PATHOLOGY

## 2020-12-04 PROCEDURE — 3008F BODY MASS INDEX DOCD: CPT | Mod: CPTII,S$GLB,, | Performed by: UROLOGY

## 2020-12-04 PROCEDURE — 1101F PR PT FALLS ASSESS DOC 0-1 FALLS W/OUT INJ PAST YR: ICD-10-PCS | Mod: CPTII,S$GLB,, | Performed by: UROLOGY

## 2020-12-04 PROCEDURE — 3288F PR FALLS RISK ASSESSMENT DOCUMENTED: ICD-10-PCS | Mod: CPTII,S$GLB,, | Performed by: UROLOGY

## 2020-12-04 PROCEDURE — 81000 CHG URINALYSIS, NONAUTO, W/SCOPE: ICD-10-PCS | Mod: S$GLB,,, | Performed by: UROLOGY

## 2020-12-04 PROCEDURE — 3288F FALL RISK ASSESSMENT DOCD: CPT | Mod: CPTII,S$GLB,, | Performed by: UROLOGY

## 2020-12-04 PROCEDURE — 99999 PR PBB SHADOW E&M-EST. PATIENT-LVL III: CPT | Mod: PBBFAC,,, | Performed by: UROLOGY

## 2020-12-04 PROCEDURE — 99214 OFFICE O/P EST MOD 30 MIN: CPT | Mod: 25,S$GLB,, | Performed by: UROLOGY

## 2020-12-04 PROCEDURE — 87086 URINE CULTURE/COLONY COUNT: CPT

## 2020-12-04 PROCEDURE — 1126F AMNT PAIN NOTED NONE PRSNT: CPT | Mod: S$GLB,,, | Performed by: UROLOGY

## 2020-12-04 PROCEDURE — 87088 URINE BACTERIA CULTURE: CPT

## 2020-12-04 NOTE — PROGRESS NOTES
OFFICE NOTE  [unfilled]  [id number]  12/4/2020       CHIEF COMPLAINT:   adenocarcinoma prostate, hematuria     HISTORY OF PRESENT ILLNESS:   this 70-year-old male returns routine recheck.  He has history of adenocarcinoma prostate for which he underwent a robotic radical prostatectomy in 2005 at the [facility] with Dr. Bowen and so far has shown no evidence of any recurrences.  His most recent PSA was less than 0.1 on 10/26/2020.  He also has in intermittent episodes of hematuria for which seen in the emergency room on 10/08/2020.  CT scan done at the time was essentially unremarkable and subsequent follow-up visit in the office on 10/23/2020 there was no evidence any hematuria and was decided to continue to observe him.  He denies any complaints and has not noticed any hematuria.  He does continue to take oxybutynin 5 mg p.o. t.i.d. for his lower tract symptoms which continues to be effective.       PSA  - less than 0.1 on 10/26/2020     UA - moderate blood with pH 5.5     FINAL IMPRESSION:   adenocarcinoma prostate, hematuria     RECOMMENDATIONS:  Urine for culture and cytology.  Subsequent consider for cystoscopy retrogrades .

## 2020-12-07 LAB — BACTERIA UR CULT: ABNORMAL

## 2020-12-08 ENCOUNTER — TELEPHONE (OUTPATIENT)
Dept: UROLOGY | Facility: CLINIC | Age: 70
End: 2020-12-08

## 2020-12-08 LAB — FINAL PATHOLOGIC DIAGNOSIS: NORMAL

## 2020-12-08 RX ORDER — AMOXICILLIN AND CLAVULANATE POTASSIUM 875; 125 MG/1; MG/1
1 TABLET, FILM COATED ORAL 2 TIMES DAILY
Qty: 20 TABLET | Refills: 0 | Status: SHIPPED | OUTPATIENT
Start: 2020-12-08 | End: 2020-12-18

## 2020-12-08 NOTE — TELEPHONE ENCOUNTER
Please call and inform pt of the following:    Augmentin BID sent into pharmacy for pt to begin taking today for current UTI+ of E.Coli.  Prescribing for Dr. Euceda due to MD being out of town at this time.    Thanks.

## 2020-12-14 ENCOUNTER — DOCUMENTATION ONLY (OUTPATIENT)
Dept: UROLOGY | Facility: CLINIC | Age: 70
End: 2020-12-14

## 2020-12-14 RX ORDER — SULFAMETHOXAZOLE AND TRIMETHOPRIM 800; 160 MG/1; MG/1
1 TABLET ORAL 2 TIMES DAILY
Qty: 30 TABLET | Refills: 0 | Status: SHIPPED | OUTPATIENT
Start: 2020-12-14 | End: 2021-11-11

## 2021-01-09 ENCOUNTER — IMMUNIZATION (OUTPATIENT)
Dept: PRIMARY CARE CLINIC | Facility: CLINIC | Age: 71
End: 2021-01-09
Payer: MEDICARE

## 2021-01-09 DIAGNOSIS — Z23 NEED FOR VACCINATION: ICD-10-CM

## 2021-01-09 PROCEDURE — 0001A COVID-19, MRNA, LNP-S, PF, 30 MCG/0.3 ML DOSE VACCINE: CPT | Mod: S$GLB,,, | Performed by: FAMILY MEDICINE

## 2021-01-09 PROCEDURE — 91300 COVID-19, MRNA, LNP-S, PF, 30 MCG/0.3 ML DOSE VACCINE: CPT | Mod: S$GLB,,, | Performed by: FAMILY MEDICINE

## 2021-01-09 PROCEDURE — 0001A COVID-19, MRNA, LNP-S, PF, 30 MCG/0.3 ML DOSE VACCINE: ICD-10-PCS | Mod: S$GLB,,, | Performed by: FAMILY MEDICINE

## 2021-01-09 PROCEDURE — 91300 COVID-19, MRNA, LNP-S, PF, 30 MCG/0.3 ML DOSE VACCINE: ICD-10-PCS | Mod: S$GLB,,, | Performed by: FAMILY MEDICINE

## 2021-01-30 ENCOUNTER — IMMUNIZATION (OUTPATIENT)
Dept: PRIMARY CARE CLINIC | Facility: CLINIC | Age: 71
End: 2021-01-30
Payer: MEDICARE

## 2021-01-30 DIAGNOSIS — Z23 NEED FOR VACCINATION: Primary | ICD-10-CM

## 2021-01-30 PROCEDURE — 91300 COVID-19, MRNA, LNP-S, PF, 30 MCG/0.3 ML DOSE VACCINE: ICD-10-PCS | Mod: S$GLB,,, | Performed by: FAMILY MEDICINE

## 2021-01-30 PROCEDURE — 91300 COVID-19, MRNA, LNP-S, PF, 30 MCG/0.3 ML DOSE VACCINE: CPT | Mod: S$GLB,,, | Performed by: FAMILY MEDICINE

## 2021-01-30 PROCEDURE — 0002A COVID-19, MRNA, LNP-S, PF, 30 MCG/0.3 ML DOSE VACCINE: CPT | Mod: S$GLB,,, | Performed by: FAMILY MEDICINE

## 2021-01-30 PROCEDURE — 0002A COVID-19, MRNA, LNP-S, PF, 30 MCG/0.3 ML DOSE VACCINE: ICD-10-PCS | Mod: S$GLB,,, | Performed by: FAMILY MEDICINE

## 2021-02-05 ENCOUNTER — PATIENT MESSAGE (OUTPATIENT)
Dept: HOME HEALTH SERVICES | Facility: CLINIC | Age: 71
End: 2021-02-05

## 2021-02-09 ENCOUNTER — PATIENT MESSAGE (OUTPATIENT)
Dept: UROLOGY | Facility: CLINIC | Age: 71
End: 2021-02-09

## 2021-10-04 ENCOUNTER — IMMUNIZATION (OUTPATIENT)
Dept: PRIMARY CARE CLINIC | Facility: CLINIC | Age: 71
End: 2021-10-04
Payer: MEDICARE

## 2021-10-04 DIAGNOSIS — Z23 NEED FOR VACCINATION: Primary | ICD-10-CM

## 2021-10-04 PROCEDURE — 0003A COVID-19, MRNA, LNP-S, PF, 30 MCG/0.3 ML DOSE VACCINE: ICD-10-PCS | Mod: S$GLB,,, | Performed by: FAMILY MEDICINE

## 2021-10-04 PROCEDURE — 91300 COVID-19, MRNA, LNP-S, PF, 30 MCG/0.3 ML DOSE VACCINE: CPT | Mod: S$GLB,,, | Performed by: FAMILY MEDICINE

## 2021-10-04 PROCEDURE — 0003A COVID-19, MRNA, LNP-S, PF, 30 MCG/0.3 ML DOSE VACCINE: CPT | Mod: S$GLB,,, | Performed by: FAMILY MEDICINE

## 2021-10-04 PROCEDURE — 91300 COVID-19, MRNA, LNP-S, PF, 30 MCG/0.3 ML DOSE VACCINE: ICD-10-PCS | Mod: S$GLB,,, | Performed by: FAMILY MEDICINE

## 2021-11-11 ENCOUNTER — OFFICE VISIT (OUTPATIENT)
Dept: FAMILY MEDICINE | Facility: CLINIC | Age: 71
End: 2021-11-11
Payer: MEDICARE

## 2021-11-11 VITALS
DIASTOLIC BLOOD PRESSURE: 78 MMHG | WEIGHT: 251 LBS | HEART RATE: 80 BPM | BODY MASS INDEX: 35.93 KG/M2 | SYSTOLIC BLOOD PRESSURE: 128 MMHG | HEIGHT: 70 IN

## 2021-11-11 DIAGNOSIS — M15.9 PRIMARY OSTEOARTHRITIS INVOLVING MULTIPLE JOINTS: ICD-10-CM

## 2021-11-11 DIAGNOSIS — G47.33 OBSTRUCTIVE SLEEP APNEA ON CPAP: ICD-10-CM

## 2021-11-11 DIAGNOSIS — R51.9 RECURRENT OCCIPITAL HEADACHE: ICD-10-CM

## 2021-11-11 DIAGNOSIS — I10 PRIMARY HYPERTENSION: Primary | ICD-10-CM

## 2021-11-11 DIAGNOSIS — Z91.81 AT HIGH RISK FOR FALLS: ICD-10-CM

## 2021-11-11 PROCEDURE — 99214 PR OFFICE/OUTPT VISIT, EST, LEVL IV, 30-39 MIN: ICD-10-PCS | Mod: S$GLB,,, | Performed by: FAMILY MEDICINE

## 2021-11-11 PROCEDURE — 99214 OFFICE O/P EST MOD 30 MIN: CPT | Mod: S$GLB,,, | Performed by: FAMILY MEDICINE

## 2021-11-11 PROCEDURE — 4010F ACE/ARB THERAPY RXD/TAKEN: CPT | Mod: S$GLB,,, | Performed by: FAMILY MEDICINE

## 2021-11-11 PROCEDURE — 4010F PR ACE/ARB THEARPY RXD/TAKEN: ICD-10-PCS | Mod: S$GLB,,, | Performed by: FAMILY MEDICINE

## 2021-11-11 RX ORDER — HYDROXYZINE HYDROCHLORIDE 25 MG/1
25 TABLET, FILM COATED ORAL EVERY 6 HOURS PRN
COMMUNITY

## 2022-01-25 ENCOUNTER — OFFICE VISIT (OUTPATIENT)
Dept: ORTHOPEDICS | Facility: CLINIC | Age: 72
End: 2022-01-25
Payer: MEDICARE

## 2022-01-25 VITALS — BODY MASS INDEX: 34.36 KG/M2 | HEIGHT: 70 IN | WEIGHT: 240 LBS

## 2022-01-25 DIAGNOSIS — Z96.651 HISTORY OF TOTAL KNEE ARTHROPLASTY, RIGHT: Primary | ICD-10-CM

## 2022-01-25 PROCEDURE — 3008F PR BODY MASS INDEX (BMI) DOCUMENTED: ICD-10-PCS | Mod: S$GLB,,, | Performed by: ORTHOPAEDIC SURGERY

## 2022-01-25 PROCEDURE — 3008F BODY MASS INDEX DOCD: CPT | Mod: S$GLB,,, | Performed by: ORTHOPAEDIC SURGERY

## 2022-01-25 PROCEDURE — 1159F PR MEDICATION LIST DOCUMENTED IN MEDICAL RECORD: ICD-10-PCS | Mod: S$GLB,,, | Performed by: ORTHOPAEDIC SURGERY

## 2022-01-25 PROCEDURE — 99213 PR OFFICE/OUTPT VISIT, EST, LEVL III, 20-29 MIN: ICD-10-PCS | Mod: S$GLB,,, | Performed by: ORTHOPAEDIC SURGERY

## 2022-01-25 PROCEDURE — 3288F PR FALLS RISK ASSESSMENT DOCUMENTED: ICD-10-PCS | Mod: S$GLB,,, | Performed by: ORTHOPAEDIC SURGERY

## 2022-01-25 PROCEDURE — 1160F RVW MEDS BY RX/DR IN RCRD: CPT | Mod: S$GLB,,, | Performed by: ORTHOPAEDIC SURGERY

## 2022-01-25 PROCEDURE — 99213 OFFICE O/P EST LOW 20 MIN: CPT | Mod: S$GLB,,, | Performed by: ORTHOPAEDIC SURGERY

## 2022-01-25 PROCEDURE — 1159F MED LIST DOCD IN RCRD: CPT | Mod: S$GLB,,, | Performed by: ORTHOPAEDIC SURGERY

## 2022-01-25 PROCEDURE — 1125F AMNT PAIN NOTED PAIN PRSNT: CPT | Mod: S$GLB,,, | Performed by: ORTHOPAEDIC SURGERY

## 2022-01-25 PROCEDURE — 1125F PR PAIN SEVERITY QUANTIFIED, PAIN PRESENT: ICD-10-PCS | Mod: S$GLB,,, | Performed by: ORTHOPAEDIC SURGERY

## 2022-01-25 PROCEDURE — 1160F PR REVIEW ALL MEDS BY PRESCRIBER/CLIN PHARMACIST DOCUMENTED: ICD-10-PCS | Mod: S$GLB,,, | Performed by: ORTHOPAEDIC SURGERY

## 2022-01-25 PROCEDURE — 1100F PR PT FALLS ASSESS DOC 2+ FALLS/FALL W/INJURY/YR: ICD-10-PCS | Mod: S$GLB,,, | Performed by: ORTHOPAEDIC SURGERY

## 2022-01-25 PROCEDURE — 1100F PTFALLS ASSESS-DOCD GE2>/YR: CPT | Mod: S$GLB,,, | Performed by: ORTHOPAEDIC SURGERY

## 2022-01-25 PROCEDURE — 3288F FALL RISK ASSESSMENT DOCD: CPT | Mod: S$GLB,,, | Performed by: ORTHOPAEDIC SURGERY

## 2022-01-25 NOTE — PROGRESS NOTES
[facility]    Subjective:     Chief Complaint:   Chief Complaint   Patient presents with    Right Knee - Pain     Pt is here with complaints of Rt. Knee pain x 2 months knee feels like it has fluid in it. Gives out, Pt fell 5 days ago.        Past Medical History:   Diagnosis Date    Anxiety     Arthritis     Cancer 2005    Prostate    Depression     Encounter for blood transfusion     GERD (gastroesophageal reflux disease)     Hyperlipemia     Hypertension     Neuropathy     Obstructive sleep apnea on CPAP     PTSD (post-traumatic stress disorder)        Past Surgical History:   Procedure Laterality Date    BACK SURGERY      burnt nerves      ELBOW SURGERY  2010, 2008    left, right    GALLBLADDER SURGERY      JOINT REPLACEMENT  2007, 2012    Bilateral knees    KNEE SURGERY  2001, 2007    left and right knee    Neck surgery with plate      Crushed C2-3-4    PROSTATECTOMY  2005    SHOULDER SURGERY  2004    right shoulder       Current Outpatient Medications   Medication Sig    ammonium lactate 12 % Crea Apply topically.    atorvastatin (LIPITOR) 80 MG tablet Take 40 mg by mouth every evening.     busPIRone (BUSPAR) 10 MG tablet Take 10 mg by mouth 3 (three) times daily.    clotrimazole (LOTRIMIN) 1 % Soln Apply 1 application topically 2 (two) times daily.     desonide (DESOWEN) 0.05 % cream Apply topically 2 (two) times daily.    DULoxetine (CYMBALTA) 20 MG capsule Take 20 mg by mouth 2 (two) times daily.    hydrOXYzine HCL (ATARAX) 25 MG tablet Take 25 mg by mouth every 6 (six) hours as needed.    ketoconazole (NIZORAL) 2 % shampoo Apply 1 application topically twice a week.     lanolin/mineral oil/petrolatum (ARTIFICIAL TEARS OPHT) Apply to eye.    losartan (COZAAR) 50 MG tablet Take 50 mg by mouth once daily.    methocarbamol (ROBAXIN) 750 MG Tab Take 500 mg by mouth 3 (three) times daily.    naproxen (NAPROSYN) 500 MG tablet Take 500 mg by mouth 2 (two) times daily  with meals.    oxybutynin (DITROPAN) 5 MG Tab Take 5 mg by mouth 3 (three) times daily.    pantoprazole (PROTONIX) 40 MG tablet Take 40 mg by mouth once daily.    polyvinyl alcohol, artificial tears, (LIQUIFILM TEARS) 1.4 % ophthalmic solution 1 drop as needed.    urea (CARMOL) 10 % cream Apply 1 application topically as needed.     VITAMIN B COMPLEX ORAL Take 1 tablet by mouth Daily.     gabapentin (NEURONTIN) 400 MG capsule Take 400 mg by mouth 3 (three) times daily.     No current facility-administered medications for this visit.       Review of patient's allergies indicates:  No Known Allergies    No family history on file.    Social History     Socioeconomic History    Marital status:    Tobacco Use    Smoking status: Former Smoker     Packs/day: 0.50     Years: 10.00     Pack years: 5.00     Types: Cigarettes     Quit date: 1981     Years since quittin.9    Smokeless tobacco: Never Used   Substance and Sexual Activity    Alcohol use: Yes     Comment: rare socially     Drug use: No    Sexual activity: Not Currently       History of present illness:  Mr. Bull presents to the clinic today as a new patient with a chief complaint of right knee pain, swelling, and weakness.  He has a history of right total knee arthroplasty in 2006 with Dr. Tong.  He underwent an open debridement with Dr. Chase of this right knee for scar tissue removal in 2018.  He does have a remote history of some type of cervical spine injury where he has had multiple level work in his cervical spine residual neurological deficit throughout his right side to include right upper extremity and right lower extremity.  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      Review of Systems:    Constitution: Negative for chills, fever, and sweats.  Negative for unexplained weight loss.    HENT:  Negative for headaches and blurry vision.    Cardiovascular:Negative for chest pain or irregular heart beat. Negative for  hypertension.    Respiratory:  Negative for cough and shortness of breath.    Gastrointestinal: Negative for abdominal pain, heartburn, melena, nausea, and vomitting.    Genitourinary:  Negative bladder incontinence and dysuria.    Musculoskeletal:  See HPI for details.     Neurological: Negative for numbness.    Psychiatric/Behavioral: Negative for depression.  The patient is not nervous/anxious.      Endocrine: Negative for polyuria    Hematologic/Lymphatic: Negative for bleeding problem.  Does not bruise/bleed easily.    Skin: Negative for poor would healing and rash    Objective:      Physical Examination:    Vital Signs:  There were no vitals filed for this visit.    Body mass index is 34.44 kg/m².    This a well-developed, well nourished patient in no acute distress.  They are alert and oriented and cooperative to examination.        Right knee exam:  Skin right knee is clean dry and intact.  Right knee anterior midline incisions well healed without wound dehiscence or drainage.  There is no signs or symptoms of infection.  There is no erythema or ecchymosis.  Patient right knee range of motion is approximately 0-100 degrees.  Right knee is stable to varus and valgus stresses.  There is no effusion.  Right calf is soft and nontender.  Patient can weightbear as tolerated right lower extremity and ambulates with a rolling walker.    Pertinent New Results:    XRAY Report / Interpretation:   Three views were taken of his right knee today:  AP, lateral sunrise views.  They reveal no acute fractures or dislocations.  Patient does have an anatomically placed right total knee arthroplasty without evidence of hardware failure or subsidence.    Assessment/Plan:      1.  Right knee pain status post total knee arthroplasty.  2.  Right lower extremity neuropathy.    Treatment options discussed with the patient at length today.  He really has 2 options, live with the knee as is or proceed with total knee arthroplasty  "revision.  I do not think could be beneficial for him to proceed with surgical intervention.  If his current situation is tolerable for him I would advise him to continue along that path.  Of course, if it does worsen become more painful or problematic, surgical intervention is potentially warranted at that time.  He will follow up with us on an as-needed basis.      Cullen Vallejo, Physician Assistant, served in the capacity as a "scribe" for this patient encounter.  A "face-to-face" encounter occurred with Dr. Gonzalez Acosta on this date.  The treatment plan and medical decision-making is outlined above. Patient was seen and examined with a chaperone.     This note was created using Dragon voice recognition software that occasionally misinterpreted phrases or words.          "

## 2022-11-03 ENCOUNTER — CLINICAL SUPPORT (OUTPATIENT)
Dept: FAMILY MEDICINE | Facility: CLINIC | Age: 72
End: 2022-11-03
Payer: MEDICARE

## 2022-11-03 DIAGNOSIS — Z23 NEED FOR INFLUENZA VACCINATION: Primary | ICD-10-CM

## 2022-11-03 PROCEDURE — 90662 FLU VACCINE - QUADRIVALENT - HIGH DOSE (65+) PRESERVATIVE FREE IM: ICD-10-PCS | Mod: S$GLB,,, | Performed by: FAMILY MEDICINE

## 2022-11-03 PROCEDURE — G0008 FLU VACCINE - QUADRIVALENT - HIGH DOSE (65+) PRESERVATIVE FREE IM: ICD-10-PCS | Mod: S$GLB,,, | Performed by: FAMILY MEDICINE

## 2022-11-03 PROCEDURE — G0008 ADMIN INFLUENZA VIRUS VAC: HCPCS | Mod: S$GLB,,, | Performed by: FAMILY MEDICINE

## 2022-11-03 PROCEDURE — 90662 IIV NO PRSV INCREASED AG IM: CPT | Mod: S$GLB,,, | Performed by: FAMILY MEDICINE

## 2022-11-08 DIAGNOSIS — Z96.651 PAIN DUE TO TOTAL RIGHT KNEE REPLACEMENT: Primary | ICD-10-CM

## 2022-11-08 DIAGNOSIS — T84.84XA PAIN DUE TO TOTAL RIGHT KNEE REPLACEMENT: Primary | ICD-10-CM

## 2022-11-11 DIAGNOSIS — M25.561 RIGHT KNEE PAIN: Primary | ICD-10-CM

## 2022-12-05 ENCOUNTER — HOSPITAL ENCOUNTER (OUTPATIENT)
Dept: RADIOLOGY | Facility: HOSPITAL | Age: 72
Discharge: HOME | End: 2022-12-05
Attending: ORTHOPAEDIC SURGERY
Payer: MEDICARE

## 2022-12-05 DIAGNOSIS — T84.84XA PAIN DUE TO TOTAL RIGHT KNEE REPLACEMENT: ICD-10-CM

## 2022-12-05 DIAGNOSIS — Z96.651 PAIN DUE TO TOTAL RIGHT KNEE REPLACEMENT: ICD-10-CM

## 2022-12-05 DIAGNOSIS — M25.561 RIGHT KNEE PAIN: ICD-10-CM

## 2022-12-05 PROCEDURE — A9503 TC99M MEDRONATE: HCPCS

## 2022-12-05 PROCEDURE — 73564 X-RAY EXAM KNEE 4 OR MORE: CPT | Mod: TC,RT

## 2024-05-28 DIAGNOSIS — Z00.00 ENCOUNTER FOR MEDICARE ANNUAL WELLNESS EXAM: ICD-10-CM

## 2024-08-30 ENCOUNTER — OFFICE VISIT (OUTPATIENT)
Dept: UROLOGY | Facility: CLINIC | Age: 74
End: 2024-08-30
Payer: OTHER GOVERNMENT

## 2024-08-30 VITALS — HEIGHT: 70 IN | WEIGHT: 211 LBS | BODY MASS INDEX: 30.21 KG/M2

## 2024-08-30 DIAGNOSIS — R31.0 GROSS HEMATURIA: Primary | ICD-10-CM

## 2024-08-30 DIAGNOSIS — Z85.46 HISTORY OF PROSTATE CANCER: ICD-10-CM

## 2024-08-30 DIAGNOSIS — R35.0 URINARY FREQUENCY: ICD-10-CM

## 2024-08-30 LAB
BILIRUBIN, UA POC OHS: NEGATIVE
BLOOD, UA POC OHS: ABNORMAL
CLARITY, UA POC OHS: CLEAR
COLOR, UA POC OHS: YELLOW
GLUCOSE, UA POC OHS: NEGATIVE
KETONES, UA POC OHS: NEGATIVE
LEUKOCYTES, UA POC OHS: ABNORMAL
NITRITE, UA POC OHS: POSITIVE
PH, UA POC OHS: 6.5
POC RESIDUAL URINE VOLUME: 27 ML (ref 0–100)
PROTEIN, UA POC OHS: 30
SPECIFIC GRAVITY, UA POC OHS: >=1.03
UROBILINOGEN, UA POC OHS: 1

## 2024-08-30 PROCEDURE — 99213 OFFICE O/P EST LOW 20 MIN: CPT | Mod: PBBFAC,PO | Performed by: UROLOGY

## 2024-08-30 PROCEDURE — 87086 URINE CULTURE/COLONY COUNT: CPT | Performed by: UROLOGY

## 2024-08-30 PROCEDURE — 99999 PR PBB SHADOW E&M-EST. PATIENT-LVL III: CPT | Mod: PBBFAC,,, | Performed by: UROLOGY

## 2024-08-30 NOTE — PROGRESS NOTES
[facility] New Patient/H&P:    Jorgito Mora III is a 74 y.o. male who presents for gross hematuria and lower urinary tract symptoms.    Patient with a history of HTN, VASILE and prostate cancer s/p RALP in 2005 with Dr. Bowen. He reports bothersome lower urinary tract symptoms for the past 6 months - frequency, urgency, straining, straining and nocturia x 3. He states that he has been treated [facility] B[facility] over [facility]. No urine cultures performed.    He wears Depends daily due to stress incontinence after his surgery.     On review of records, he had gross hematuria in 10/2020. CT at the time unremarkable. Underwent evaluation with Dr. Euceda. No work-up performed at the time as his hematuria resolved after Abx therapy.     Urine dipstick only with moderate blood, positive nitrites and moderate leukocytes.     Retired from [employer]. [employer] . Smoked 1/2 ppd for 15 years. Quit in approximately 1980.     Denies any fever, chills, flank pain, bone pain, unintentional weight loss or  trauma.       IPSS QoL  19 5 8/30/24    PVR  27 mL  8/30/24    PSA  <0.1  8/30/24    Urine culture  E. Coli  12/4/20  E. Coli  10/8/20      Past Medical History:   Diagnosis Date    Anxiety     Arthritis     Cancer 2005    Prostate    Depression     Encounter for blood transfusion     GERD (gastroesophageal reflux disease)     Hyperlipemia     Hypertension     Neuropathy     Obstructive sleep apnea on CPAP     PTSD (post-traumatic stress disorder)        Past Surgical History:   Procedure Laterality Date    BACK SURGERY      burnt nerves      ELBOW SURGERY  2010, 2008    left, right    GALLBLADDER SURGERY      JOINT REPLACEMENT  2007, 2012    Bilateral knees    KNEE SURGERY  2001, 2007    left and right knee    Neck surgery with plate      Crushed C2-3-4    PROSTATECTOMY  2005    SHOULDER SURGERY  2004    right shoulder       No family history on  "file.    Review of patient's allergies indicates:  No Known Allergies    Medications Reviewed: see MAR      FOCUSED PHYSICAL EXAM:    There were no vitals filed for this visit.  Body mass index is 30.28 kg/m². Weight: 95.7 kg (211 lb) Height: 5' 10" (177.8 cm)       General: Alert, cooperative, no distress, appears stated age  Abdomen: Soft, non-tender, no CVA tenderness, non-distended        LABS:    Recent Results (from the past 336 hour(s))   POCT Urinalysis(Instrument)    Collection Time: 08/30/24 10:37 AM   Result Value Ref Range    Color, POC UA Yellow Yellow, Straw, Colorless    Clarity, POC UA Clear Clear    Glucose, POC UA Negative Negative    Bilirubin, POC UA Negative Negative    Ketones, POC UA Negative Negative    Spec Grav POC UA >=1.030 1.005 - 1.030    Blood, POC UA Moderate (A) Negative    pH, POC UA 6.5 5.0 - 8.0    Protein, POC UA 30 (A) Negative    Urobilinogen, POC UA 1.0 <=1.0    Nitrite, POC UA Positive (A) Negative    WBC, POC UA Moderate (A) Negative   POCT Bladder Scan    Collection Time: 08/30/24 10:37 AM   Result Value Ref Range    POC Residual Urine Volume 27 0 - 100 mL           Assessment/Diagnosis:    1. Gross hematuria  Urine culture    Cytology, Urine    CT Urogram Abd Pelvis W WO    Creatinine, Serum    Procedure Order to Urology      2. Urinary frequency  POCT Urinalysis(Instrument)    POCT Bladder Scan    Procedure Order to Urology      3. History of prostate cancer  PROSTATE SPECIFIC ANTIGEN, DIAGNOSTIC    Procedure Order to Urology          Plans:    - I spent 45 minutes of the day of this encounter preparing for, treating and managing this patient. We discussed the etiology and management of the patient's gross hematuria. Explained that hematuria is multi-factorial and can be secondary to  cancer, obstructive uropathy, nephritis,  trauma,  infections, thrombosis, nephrolithiasis, bleeding disorders and medications. We discussed hematuria work-up and the benefit of " further evaluation with cystourethroscopy and CT urogram to rule out any malignancy, stones or other pathology. All risks, benefits and alternatives discussed at length and all questions answered.    - Urine culture and cytology today.   - CT urogram next available.   - PSA in 2 weeks.   - Cystoscopy at the [facility] on 9/25/24.

## 2024-08-31 LAB — BACTERIA UR CULT: ABNORMAL

## 2024-09-03 DIAGNOSIS — R31.0 GROSS HEMATURIA: Primary | ICD-10-CM

## 2024-09-03 RX ORDER — CEPHALEXIN 500 MG/1
500 CAPSULE ORAL EVERY 8 HOURS
Qty: 21 CAPSULE | Refills: 0 | Status: SHIPPED | OUTPATIENT
Start: 2024-09-03 | End: 2024-09-10

## 2024-09-03 NOTE — PROGRESS NOTES
Procedure Order to Urology [[id number]]    Electronically signed by: Ryan Christianson Jr., MD on 08/30/24 1053 Status: Active   Ordering user: Ryan Christianson Jr., MD 08/30/24 1053 Authorized by: Ryan Christianson Jr., MD   Ordering mode: Standard   Frequency:  08/30/24 -     Diagnoses  Urinary frequency [R35.0]  Gross hematuria [R31.0]  History of prostate cancer [Z85.46]   Questionnaire    Question Answer   Procedure Cystoscopy   Is patient on anti-coagulants? Yes   Pre-op Diagnosis Gross hematuria   Facility Name: [facility], Please order Urine POCT without micro . Local sedation (no urine Dr Christianson or Dr rahman)

## 2024-09-18 ENCOUNTER — LAB VISIT (OUTPATIENT)
Dept: LAB | Facility: HOSPITAL | Age: 74
End: 2024-09-18
Attending: UROLOGY
Payer: MEDICARE

## 2024-09-18 ENCOUNTER — HOSPITAL ENCOUNTER (OUTPATIENT)
Dept: RADIOLOGY | Facility: HOSPITAL | Age: 74
Discharge: HOME | End: 2024-09-18
Attending: UROLOGY
Payer: OTHER GOVERNMENT

## 2024-09-18 DIAGNOSIS — R31.0 GROSS HEMATURIA: ICD-10-CM

## 2024-09-18 LAB
CREAT SERPL-MCNC: 0.9 MG/DL (ref 0.5–1.4)
EST. GFR  (NO RACE VARIABLE): >60 ML/MIN/1.73 M^2

## 2024-09-18 PROCEDURE — 82565 ASSAY OF CREATININE: CPT | Performed by: UROLOGY

## 2024-09-18 PROCEDURE — 74178 CT ABD&PLV WO CNTR FLWD CNTR: CPT | Mod: TC

## 2024-09-18 PROCEDURE — 74178 CT ABD&PLV WO CNTR FLWD CNTR: CPT | Mod: 26,,, | Performed by: RADIOLOGY

## 2024-09-18 PROCEDURE — 25500020 PHARM REV CODE 255

## 2024-09-18 RX ADMIN — IOHEXOL 125 ML: 350 INJECTION, SOLUTION INTRAVENOUS at 11:09

## 2024-09-25 ENCOUNTER — HOSPITAL ENCOUNTER (OUTPATIENT)
Facility: HOSPITAL | Age: 74
Discharge: HOME | End: 2024-09-25
Attending: UROLOGY | Admitting: UROLOGY
Payer: OTHER GOVERNMENT

## 2024-09-25 DIAGNOSIS — R31.0 GROSS HEMATURIA: Primary | ICD-10-CM

## 2024-09-25 PROCEDURE — 87086 URINE CULTURE/COLONY COUNT: CPT | Performed by: UROLOGY

## 2024-09-25 PROCEDURE — 52000 CYSTOURETHROSCOPY: CPT | Performed by: UROLOGY

## 2024-09-25 PROCEDURE — 25000003 PHARM REV CODE 250: Performed by: UROLOGY

## 2024-09-25 PROCEDURE — A4217 STERILE WATER/SALINE, 500 ML: HCPCS | Performed by: UROLOGY

## 2024-09-25 PROCEDURE — 52000 CYSTOURETHROSCOPY: CPT | Mod: ,,, | Performed by: UROLOGY

## 2024-09-25 RX ORDER — LIDOCAINE HYDROCHLORIDE 20 MG/ML
JELLY TOPICAL
Status: DISCONTINUED | OUTPATIENT
Start: 2024-09-25 | End: 2024-09-25 | Stop reason: HOSPADM

## 2024-09-25 RX ORDER — LEVOFLOXACIN 750 MG/1
750 TABLET ORAL DAILY
Qty: 3 TABLET | Refills: 0 | Status: SHIPPED | OUTPATIENT
Start: 2024-09-25 | End: 2024-09-28

## 2024-09-25 RX ORDER — WATER 1 ML/ML
IRRIGANT IRRIGATION
Status: DISCONTINUED | OUTPATIENT
Start: 2024-09-25 | End: 2024-09-25 | Stop reason: HOSPADM

## 2024-09-25 NOTE — PLAN OF CARE
Discharge instructions given to pt/wife, verbalized understanding.  Tolerating fluids.  Denies pain.  Ambulating out with wife in no distress.

## 2024-09-25 NOTE — OP NOTE
[facility]  Operative/Discharge Note    Date: 09/25/2024    Pre-Op Diagnosis: Gross hematuria, UTI    Post-Op Diagnosis: Same    Procedure(s) Performed:   1.  Cystoscopy     Specimen(s): Urine culture    Staff Surgeon: Ryan Christianson MD    Assistant Surgeon: Ryan Christianson Jr, MD    Anesthesia: Local anesthesia topical 2% lidocaine gel    Indications: Jorgito Mora III is a 74 y.o. male with gross hematuria and UTI.     Findings: Unremarkable cystoscopy. Surgically absent prostate with no bladder neck contracture present.     Estimated Blood Loss: min    Drains: None    Procedure in Detail:  After risks, benefits and possible complications of cystoscopy were explained, the patient elected to undergo the procedure and informed consent was obtained. All questions were answered in the alcira-operative area. The patient was transferred to the cystoscopy suite and placed in the lithotomy position.  The patient was prepped and draped in the usual sterile fashion. Lidocaine jelly was administered for local anesthesia. Time out was performed.    A flexible cystoscope was introduced into the bladder per urethra. This passed easily.  The entire urethra was visualized which showed no masses or strictures.  The prostate was surgically absent. The right and left ureteral orifices were identified in the normal anatomic position and were seen effluxing clear urine.  Formal cystoscopy was performed which revealed no masses or lesions suspicious for malignancy, no trabeculations, no bladder stones and no bladder diverticula.      The patient tolerated the procedure well and was transferred to recovery in stable condition.    Disposition: Home    Discharge home today status post uncomplicated procedure as above  Diet - resume home diet  Follow up: RTC in 6 months with symptom score and PVR.  Instructions: Levaquin RX.   Meds:     Medication List        START taking these medications      levoFLOXacin 750 MG tablet  Commonly  known as: LEVAQUIN  Take 1 tablet (750 mg total) by mouth once daily. for 3 days            CONTINUE taking these medications      ammonium lactate 12 % Crea     ARTIFICIAL TEARS OPHT     atorvastatin 80 MG tablet  Commonly known as: LIPITOR     busPIRone 10 MG tablet  Commonly known as: BUSPAR     clotrimazole 1 % Soln  Commonly known as: LOTRIMIN     desonide 0.05 % cream  Commonly known as: DESOWEN     DULoxetine 20 MG capsule  Commonly known as: CYMBALTA     hydrOXYzine HCL 25 MG tablet  Commonly known as: ATARAX     naproxen 500 MG tablet  Commonly known as: NAPROSYN     oxybutynin 5 MG Tab  Commonly known as: DITROPAN     polyvinyl alcohol (artificial tears) 1.4 % ophthalmic solution  Commonly known as: LIQUIFILM TEARS     urea 10 % cream  Commonly known as: CARMOL     VITAMIN B COMPLEX ORAL               Where to Get Your Medications        These medications were sent to [id number] - [place] [facility] - [address]  [address] [address]      Phone: [phone]   levoFLOXacin 750 MG tablet         Ryan Christianson Jr, MD

## 2024-09-26 VITALS
DIASTOLIC BLOOD PRESSURE: 86 MMHG | HEIGHT: 70 IN | BODY MASS INDEX: 30.21 KG/M2 | OXYGEN SATURATION: 98 % | RESPIRATION RATE: 18 BRPM | TEMPERATURE: 98 F | WEIGHT: 211 LBS | SYSTOLIC BLOOD PRESSURE: 169 MMHG | HEART RATE: 85 BPM

## 2024-09-28 LAB — BACTERIA UR CULT: NO GROWTH

## 2025-03-24 DIAGNOSIS — Z00.00 ENCOUNTER FOR MEDICARE ANNUAL WELLNESS EXAM: ICD-10-CM

## 2025-08-13 ENCOUNTER — LAB VISIT (OUTPATIENT)
Dept: LAB | Facility: HOSPITAL | Age: 75
End: 2025-08-13
Attending: UROLOGY
Payer: MEDICARE

## 2025-08-13 ENCOUNTER — OFFICE VISIT (OUTPATIENT)
Dept: UROLOGY | Facility: CLINIC | Age: 75
End: 2025-08-13
Payer: MEDICARE

## 2025-08-13 VITALS — HEIGHT: 70 IN | BODY MASS INDEX: 30.27 KG/M2

## 2025-08-13 DIAGNOSIS — R31.0 GROSS HEMATURIA: Primary | ICD-10-CM

## 2025-08-13 DIAGNOSIS — Z85.46 HISTORY OF PROSTATE CANCER: ICD-10-CM

## 2025-08-13 DIAGNOSIS — R35.0 URINARY FREQUENCY: ICD-10-CM

## 2025-08-13 DIAGNOSIS — R39.15 URINARY URGENCY: ICD-10-CM

## 2025-08-13 LAB
POC RESIDUAL URINE VOLUME: 0 ML (ref 0–100)
PSA SERPL-MCNC: 0.03 NG/ML (ref ?–4)

## 2025-08-13 PROCEDURE — 99214 OFFICE O/P EST MOD 30 MIN: CPT | Mod: S$GLB,,, | Performed by: UROLOGY

## 2025-08-13 PROCEDURE — 1101F PT FALLS ASSESS-DOCD LE1/YR: CPT | Mod: CPTII,S$GLB,, | Performed by: UROLOGY

## 2025-08-13 PROCEDURE — G2211 COMPLEX E/M VISIT ADD ON: HCPCS | Mod: S$GLB,,, | Performed by: UROLOGY

## 2025-08-13 PROCEDURE — 84153 ASSAY OF PSA TOTAL: CPT

## 2025-08-13 PROCEDURE — 1159F MED LIST DOCD IN RCRD: CPT | Mod: CPTII,S$GLB,, | Performed by: UROLOGY

## 2025-08-13 PROCEDURE — 3288F FALL RISK ASSESSMENT DOCD: CPT | Mod: CPTII,S$GLB,, | Performed by: UROLOGY

## 2025-08-13 PROCEDURE — 99999 PR PBB SHADOW E&M-EST. PATIENT-LVL III: CPT | Mod: PBBFAC,,, | Performed by: UROLOGY

## 2025-08-13 PROCEDURE — 1126F AMNT PAIN NOTED NONE PRSNT: CPT | Mod: CPTII,S$GLB,, | Performed by: UROLOGY

## 2025-08-13 PROCEDURE — 1160F RVW MEDS BY RX/DR IN RCRD: CPT | Mod: CPTII,S$GLB,, | Performed by: UROLOGY

## 2025-08-13 PROCEDURE — 36415 COLL VENOUS BLD VENIPUNCTURE: CPT

## 2025-08-13 PROCEDURE — 51798 US URINE CAPACITY MEASURE: CPT | Mod: S$GLB,,, | Performed by: UROLOGY

## 2025-08-13 RX ORDER — OXYBUTYNIN CHLORIDE 15 MG/1
15 TABLET, EXTENDED RELEASE ORAL DAILY
Qty: 90 TABLET | Refills: 3 | Status: SHIPPED | OUTPATIENT
Start: 2025-08-13 | End: 2026-08-13

## (undated) DEVICE — UNDERPAD ULTRASORB 300LB 30X36

## (undated) DEVICE — PADDING CAST SPECIALIST 6X4YD

## (undated) DEVICE — SEE MEDLINE ITEM 153151

## (undated) DEVICE — SCRUB HIBICLENS 4% CHG 4OZ

## (undated) DEVICE — CLOSURE SKIN STERI STRIP 1/2X4

## (undated) DEVICE — SPONGE COTTON TRAY 4X4IN

## (undated) DEVICE — GLOVE SENSICARE PI ALOE 7.5

## (undated) DEVICE — DRAPE INCISE IOBAN 2 23X17IN

## (undated) DEVICE — COLD THER SYS W/PAD UNV RH

## (undated) DEVICE — UNDERGLOVES BIOGEL PI SIZE 8

## (undated) DEVICE — SKINMARKER W/RULER DEVON

## (undated) DEVICE — DRESSING N ADH OIL EMUL 3X8 3S

## (undated) DEVICE — SEE MEDLINE ITEM 157166

## (undated) DEVICE — PACK ARTHROSCOPY W/ISO BAC

## (undated) DEVICE — SEE MEDLINE ITEM 157131

## (undated) DEVICE — STAPLER SKIN ROTATING HEAD

## (undated) DEVICE — COVER SURG LIGHT HANDLE

## (undated) DEVICE — SEE MEDLINE ITEM 152622

## (undated) DEVICE — DRESSING TRANS 4X4 TEGADERM

## (undated) DEVICE — CUTTER MENISCUS AGGRESSIVE 4.0

## (undated) DEVICE — SEE MEDLINE ITEM 152487

## (undated) DEVICE — SOL 9P NACL IRR PIC IL

## (undated) DEVICE — NDL SPINAL 18GX3.5 SPINOCAN

## (undated) DEVICE — TOURNIQUET SB QC DP 34X4IN

## (undated) DEVICE — SUT MONOCRYL 3-0 PS-1

## (undated) DEVICE — GOWN X-LG STERILE BACK

## (undated) DEVICE — PACK CUSTOM UNIV BASIN SLI

## (undated) DEVICE — STRIP SKIN TRACTION ADH 3X40 L

## (undated) DEVICE — BLADE ELECTRO EXTENDED.

## (undated) DEVICE — GLOVE SURG ULTRA TOUCH 7.5

## (undated) DEVICE — SEE MEDLINE ITEM 152530

## (undated) DEVICE — ALCOHOL 70% ISOP RUBBING 4OZ

## (undated) DEVICE — SYR 30CC LUER LOCK

## (undated) DEVICE — SYS LABEL CORRECT MED

## (undated) DEVICE — GAUZE SPONGE BULKEE 6X6.75IN

## (undated) DEVICE — BANDAGE ACE ELASTIC 6"

## (undated) DEVICE — NDL BOX COUNTER

## (undated) DEVICE — NDL ECLIPSE SAFETY 18GX1-1/2IN

## (undated) DEVICE — TAPE SILK 3IN

## (undated) DEVICE — PAD ABD 8X10 STERILE

## (undated) DEVICE — TUBING ARTHRO IRR 4-LEAD

## (undated) DEVICE — DRESSING N ADH OIL EMUL 3X3

## (undated) DEVICE — PADDING CAST 6IN DELTA ROLL

## (undated) DEVICE — SET CYSTO IRR DRP CHMBR 84IN

## (undated) DEVICE — GLOVE SURG ULTRA TOUCH 8

## (undated) DEVICE — ELECTRODE REM PLYHSV RETURN 9

## (undated) DEVICE — PROBE ABLATION RF ARTHSCP 3.5

## (undated) DEVICE — DRAIN SINGLE ROUND 3/16 15F

## (undated) DEVICE — DRAPE STERI INSTRUMENT 1018

## (undated) DEVICE — PACK SHOULDER

## (undated) DEVICE — SUT #2 TI-CRON HGS-21 30IN

## (undated) DEVICE — BLADE SAW SGTL DL STR 25X1.27X

## (undated) DEVICE — NDL SUREFIRE SCORPION RC

## (undated) DEVICE — BANDAGE ESMARK 6X12

## (undated) DEVICE — SPONGE GAUZE 16PLY 4X4

## (undated) DEVICE — SOL IRR NACL .9% 3000ML

## (undated) DEVICE — BLADE SURG CARBON STEEL SZ11

## (undated) DEVICE — TAPE SURGICAL MICROFOAM 4IN

## (undated) DEVICE — LINER SUCTION 3000CC

## (undated) DEVICE — SEE MEDLINE ITEM 146271

## (undated) DEVICE — EVACUATOR WOUND BULB 100CC

## (undated) DEVICE — BRUSH SCRUB HIBICLENS 4%

## (undated) DEVICE — SEE MEDLINE ITEM 146313

## (undated) DEVICE — SHEET DRAPE MEDIUM

## (undated) DEVICE — KIT ENDOKIT EGD/COLON/ERCP

## (undated) DEVICE — SEE MEDLINE ITEM 146270

## (undated) DEVICE — SLING SHLDR IMMOBILIZER MED

## (undated) DEVICE — SEE MEDLINE ITEM 146420

## (undated) DEVICE — Device

## (undated) DEVICE — TRAY DRY SPONGE SCRUB W FOAM

## (undated) DEVICE — SUT ETHILON 4-0 PS2 18 BLK

## (undated) DEVICE — SUT VCRL 2-0 GYN 8-18IN MO4

## (undated) DEVICE — DRESSING GAUZE 6PLY 4X4

## (undated) DEVICE — SUT VICRYL 1 MO-4 18 CR/8

## (undated) DEVICE — HOOK CEMENT BECHTOL**CALL IN**

## (undated) DEVICE — BLADE SURG #15 CARBON STEEL

## (undated) DEVICE — DRAPE XRAY CASSTTE 20X25

## (undated) DEVICE — CONTAINER SPECIMEN STRL 4OZ

## (undated) DEVICE — PACK BASIC

## (undated) DEVICE — SYR 50CC LL